# Patient Record
Sex: MALE | Race: BLACK OR AFRICAN AMERICAN | NOT HISPANIC OR LATINO | Employment: FULL TIME | URBAN - METROPOLITAN AREA
[De-identification: names, ages, dates, MRNs, and addresses within clinical notes are randomized per-mention and may not be internally consistent; named-entity substitution may affect disease eponyms.]

---

## 2021-02-23 ENCOUNTER — APPOINTMENT (EMERGENCY)
Dept: CT IMAGING | Facility: HOSPITAL | Age: 36
End: 2021-02-23
Payer: COMMERCIAL

## 2021-02-23 ENCOUNTER — HOSPITAL ENCOUNTER (EMERGENCY)
Facility: HOSPITAL | Age: 36
Discharge: HOME | End: 2021-02-24
Attending: EMERGENCY MEDICINE
Payer: COMMERCIAL

## 2021-02-23 DIAGNOSIS — M54.50 LOW BACK PAIN: Primary | ICD-10-CM

## 2021-02-23 LAB
ALBUMIN SERPL BCP-MCNC: 3.2 G/DL (ref 3.5–5)
ALP SERPL-CCNC: 57 U/L (ref 46–116)
ALT SERPL W P-5'-P-CCNC: 25 U/L (ref 12–78)
ANION GAP SERPL CALCULATED.3IONS-SCNC: 7 MMOL/L (ref 4–13)
AST SERPL W P-5'-P-CCNC: 13 U/L (ref 5–45)
BASOPHILS # BLD AUTO: 0.04 THOUSANDS/ΜL (ref 0–0.1)
BASOPHILS NFR BLD AUTO: 1 % (ref 0–1)
BILIRUB SERPL-MCNC: 0.32 MG/DL (ref 0.2–1)
BILIRUB UR QL STRIP: NEGATIVE
BUN SERPL-MCNC: 16 MG/DL (ref 5–25)
CALCIUM ALBUM COR SERPL-MCNC: 9.6 MG/DL (ref 8.3–10.1)
CALCIUM SERPL-MCNC: 9 MG/DL (ref 8.3–10.1)
CHLORIDE SERPL-SCNC: 106 MMOL/L (ref 100–108)
CLARITY UR: NORMAL
CO2 SERPL-SCNC: 29 MMOL/L (ref 21–32)
COLOR UR: YELLOW
CREAT SERPL-MCNC: 1.05 MG/DL (ref 0.6–1.3)
EOSINOPHIL # BLD AUTO: 0.09 THOUSAND/ΜL (ref 0–0.61)
EOSINOPHIL NFR BLD AUTO: 1 % (ref 0–6)
ERYTHROCYTE [DISTWIDTH] IN BLOOD BY AUTOMATED COUNT: 13 % (ref 11.6–15.1)
GFR SERPL CREATININE-BSD FRML MDRD: 107 ML/MIN/1.73SQ M
GLUCOSE SERPL-MCNC: 90 MG/DL (ref 65–140)
GLUCOSE UR STRIP-MCNC: NEGATIVE MG/DL
HCT VFR BLD AUTO: 39.8 % (ref 36.5–49.3)
HGB BLD-MCNC: 13.3 G/DL (ref 12–17)
HGB UR QL STRIP.AUTO: NEGATIVE
IMM GRANULOCYTES # BLD AUTO: 0.02 THOUSAND/UL (ref 0–0.2)
IMM GRANULOCYTES NFR BLD AUTO: 0 % (ref 0–2)
KETONES UR STRIP-MCNC: NEGATIVE MG/DL
LEUKOCYTE ESTERASE UR QL STRIP: NEGATIVE
LIPASE SERPL-CCNC: 124 U/L (ref 73–393)
LYMPHOCYTES # BLD AUTO: 2.14 THOUSANDS/ΜL (ref 0.6–4.47)
LYMPHOCYTES NFR BLD AUTO: 33 % (ref 14–44)
MCH RBC QN AUTO: 30.4 PG (ref 26.8–34.3)
MCHC RBC AUTO-ENTMCNC: 33.4 G/DL (ref 31.4–37.4)
MCV RBC AUTO: 91 FL (ref 82–98)
MONOCYTES # BLD AUTO: 0.61 THOUSAND/ΜL (ref 0.17–1.22)
MONOCYTES NFR BLD AUTO: 10 % (ref 4–12)
NEUTROPHILS # BLD AUTO: 3.53 THOUSANDS/ΜL (ref 1.85–7.62)
NEUTS SEG NFR BLD AUTO: 55 % (ref 43–75)
NITRITE UR QL STRIP: NEGATIVE
NRBC BLD AUTO-RTO: 0 /100 WBCS
PH UR STRIP.AUTO: 7.5 [PH] (ref 4.5–8)
PLATELET # BLD AUTO: 209 THOUSANDS/UL (ref 149–390)
PMV BLD AUTO: 11.4 FL (ref 8.9–12.7)
POTASSIUM SERPL-SCNC: 3.5 MMOL/L (ref 3.5–5.3)
PROT SERPL-MCNC: 6.8 G/DL (ref 6.4–8.2)
PROT UR STRIP-MCNC: NEGATIVE MG/DL
RBC # BLD AUTO: 4.38 MILLION/UL (ref 3.88–5.62)
SODIUM SERPL-SCNC: 142 MMOL/L (ref 136–145)
SP GR UR STRIP.AUTO: 1.02 (ref 1–1.03)
UROBILINOGEN UR QL STRIP.AUTO: 0.2 E.U./DL
WBC # BLD AUTO: 6.43 THOUSAND/UL (ref 4.31–10.16)

## 2021-02-23 PROCEDURE — 96374 THER/PROPH/DIAG INJ IV PUSH: CPT

## 2021-02-23 PROCEDURE — 74176 CT ABD & PELVIS W/O CONTRAST: CPT

## 2021-02-23 PROCEDURE — 80053 COMPREHEN METABOLIC PANEL: CPT | Performed by: EMERGENCY MEDICINE

## 2021-02-23 PROCEDURE — 99284 EMERGENCY DEPT VISIT MOD MDM: CPT | Performed by: EMERGENCY MEDICINE

## 2021-02-23 PROCEDURE — 99284 EMERGENCY DEPT VISIT MOD MDM: CPT

## 2021-02-23 PROCEDURE — G1004 CDSM NDSC: HCPCS

## 2021-02-23 PROCEDURE — 96375 TX/PRO/DX INJ NEW DRUG ADDON: CPT

## 2021-02-23 PROCEDURE — 36415 COLL VENOUS BLD VENIPUNCTURE: CPT | Performed by: EMERGENCY MEDICINE

## 2021-02-23 PROCEDURE — 83690 ASSAY OF LIPASE: CPT | Performed by: EMERGENCY MEDICINE

## 2021-02-23 PROCEDURE — 96361 HYDRATE IV INFUSION ADD-ON: CPT

## 2021-02-23 PROCEDURE — 81003 URINALYSIS AUTO W/O SCOPE: CPT

## 2021-02-23 PROCEDURE — 85025 COMPLETE CBC W/AUTO DIFF WBC: CPT | Performed by: EMERGENCY MEDICINE

## 2021-02-23 RX ORDER — ARIPIPRAZOLE 10 MG/1
TABLET ORAL
COMMUNITY
End: 2021-04-07 | Stop reason: HOSPADM

## 2021-02-23 RX ORDER — DIVALPROEX SODIUM 250 MG/1
250 TABLET, DELAYED RELEASE ORAL EVERY 8 HOURS SCHEDULED
COMMUNITY
End: 2021-04-07 | Stop reason: HOSPADM

## 2021-02-23 RX ORDER — MORPHINE SULFATE 4 MG/ML
4 INJECTION, SOLUTION INTRAMUSCULAR; INTRAVENOUS ONCE
Status: COMPLETED | OUTPATIENT
Start: 2021-02-23 | End: 2021-02-23

## 2021-02-23 RX ORDER — CYCLOBENZAPRINE HCL 10 MG
10 TABLET ORAL 2 TIMES DAILY PRN
Qty: 20 TABLET | Refills: 0 | Status: SHIPPED | OUTPATIENT
Start: 2021-02-23 | End: 2021-04-07 | Stop reason: HOSPADM

## 2021-02-23 RX ORDER — ACETAMINOPHEN 500 MG
500 TABLET ORAL
COMMUNITY
End: 2021-04-07 | Stop reason: HOSPADM

## 2021-02-23 RX ORDER — LIDOCAINE 50 MG/G
1 PATCH TOPICAL ONCE
Status: DISCONTINUED | OUTPATIENT
Start: 2021-02-23 | End: 2021-02-24 | Stop reason: HOSPADM

## 2021-02-23 RX ORDER — TOPIRAMATE 25 MG/1
25 TABLET ORAL 2 TIMES DAILY
COMMUNITY
End: 2021-04-07 | Stop reason: HOSPADM

## 2021-02-23 RX ORDER — ONDANSETRON 2 MG/ML
4 INJECTION INTRAMUSCULAR; INTRAVENOUS ONCE
Status: COMPLETED | OUTPATIENT
Start: 2021-02-23 | End: 2021-02-23

## 2021-02-23 RX ORDER — ACETAMINOPHEN 500 MG
1000 TABLET ORAL EVERY 8 HOURS PRN
Qty: 20 TABLET | Refills: 0 | Status: SHIPPED | OUTPATIENT
Start: 2021-02-23 | End: 2021-04-07 | Stop reason: HOSPADM

## 2021-02-23 RX ADMIN — MORPHINE SULFATE 4 MG: 4 INJECTION INTRAVENOUS at 22:04

## 2021-02-23 RX ADMIN — SODIUM CHLORIDE 1000 ML: 0.9 INJECTION, SOLUTION INTRAVENOUS at 21:59

## 2021-02-23 RX ADMIN — ONDANSETRON 4 MG: 2 INJECTION INTRAMUSCULAR; INTRAVENOUS at 22:02

## 2021-02-23 NOTE — Clinical Note
Eldon Samayoa was seen and treated in our emergency department on 2/23/2021  Diagnosis:     Theo Ochoa  may return to school on return date  He may return on this date: 02/26/2021    Then no heavy lifting over 10 lbs  For one week     If you have any questions or concerns, please don't hesitate to call        Severiano Puentes MD    ______________________________           _______________          _______________  Hospital Representative                              Date                                Time

## 2021-02-24 VITALS
OXYGEN SATURATION: 97 % | SYSTOLIC BLOOD PRESSURE: 106 MMHG | HEART RATE: 72 BPM | RESPIRATION RATE: 17 BRPM | DIASTOLIC BLOOD PRESSURE: 61 MMHG | TEMPERATURE: 97.5 F

## 2021-02-24 RX ADMIN — LIDOCAINE 1 PATCH: 50 PATCH CUTANEOUS at 00:07

## 2021-02-24 NOTE — ED PROVIDER NOTES
History  Chief Complaint   Patient presents with    Flank Pain     3-4 days worsening left flank and LLQ abdominal pain with difficulty urinating and dark colored urine as described by patient  Referred from [facility]      Abdominal Pain     C/o L flank pain radiating to the L abd  For the past 3 days, constant  Pt  Never had a kidney stone before  No heavy lifting, no injury  No fevers, no n/v/d, no urinary symptoms  Prior to Admission Medications   Prescriptions Last Dose Informant Patient Reported? Taking? ARIPiprazole (Abilify) 10 mg tablet   Yes Yes   Sig: Take by mouth   acetaminophen (TYLENOL) 500 mg tablet   Yes Yes   Sig: Take 500 mg by mouth   divalproex sodium (DEPAKOTE) 250 mg EC tablet   Yes Yes   Sig: Take 250 mg by mouth every 8 (eight) hours   topiramate (TOPAMAX) 25 mg tablet   Yes Yes   Sig: Take 25 mg by mouth 2 (two) times a day      Facility-Administered Medications: None       History reviewed  No pertinent past medical history  History reviewed  No pertinent surgical history  History reviewed  No pertinent family history  I have reviewed and agree with the history as documented  E-Cigarette/Vaping     E-Cigarette/Vaping Substances     Social History     Tobacco Use    Smoking status: Current Every Day Smoker    Smokeless tobacco: Never Used   Substance Use Topics    Alcohol use: Not Currently    Drug use: Not Currently       Review of Systems   Constitutional: Negative for appetite change, fatigue and fever  HENT: Negative for rhinorrhea and sore throat  Eyes: Negative for pain  Respiratory: Negative for cough, shortness of breath and wheezing  Cardiovascular: Negative for chest pain and leg swelling  Gastrointestinal: Positive for abdominal pain  Negative for diarrhea, nausea and vomiting  Genitourinary: Positive for flank pain  Negative for dysuria  Musculoskeletal: Negative for back pain and neck pain  Skin: Negative for rash  Neurological: Negative for syncope and headaches  Psychiatric/Behavioral:        Mood normal       Physical Exam  Physical Exam  Vitals signs and nursing note reviewed  Constitutional:       Appearance: He is well-developed  HENT:      Head: Normocephalic and atraumatic  Neck:      Musculoskeletal: Normal range of motion and neck supple  Cardiovascular:      Rate and Rhythm: Normal rate and regular rhythm  Pulmonary:      Effort: Pulmonary effort is normal  No respiratory distress  Breath sounds: Normal breath sounds  Abdominal:      Palpations: Abdomen is soft  Comments: L mid abd  Tenderness, no r/g   Musculoskeletal: Normal range of motion  Comments: L CVA tenderness   Skin:     General: Skin is warm and dry  Neurological:      Mental Status: He is alert and oriented to person, place, and time           Vital Signs  ED Triage Vitals [02/23/21 1951]   Temperature Pulse Respirations Blood Pressure SpO2   97 5 °F (36 4 °C) 76 20 114/56 98 %      Temp Source Heart Rate Source Patient Position - Orthostatic VS BP Location FiO2 (%)   Oral Monitor Sitting Left arm --      Pain Score       Worst Possible Pain           Vitals:    02/23/21 1951 02/23/21 2205 02/24/21 0009   BP: 114/56 96/51 106/61   Pulse: 76 67 72   Patient Position - Orthostatic VS: Sitting Lying Lying         Visual Acuity      ED Medications  Medications   lidocaine (LIDODERM) 5 % patch 1 patch (1 patch Topical Medication Applied 2/24/21 0007)   sodium chloride 0 9 % bolus 1,000 mL (0 mL Intravenous Stopped 2/24/21 0005)   morphine (PF) 4 mg/mL injection 4 mg (4 mg Intravenous Given 2/23/21 2204)   ondansetron (ZOFRAN) injection 4 mg (4 mg Intravenous Given 2/23/21 2202)       Diagnostic Studies  Results Reviewed     Procedure Component Value Units Date/Time    Lipase [[id number]]  (Normal) Collected: 02/23/21 2159    Lab Status: Final result Specimen: Blood from Arm, Right Updated: 02/23/21 2231     Lipase 124 u/L Comprehensive metabolic panel [[id number]]  (Abnormal) Collected: 02/23/21 2159    Lab Status: Final result Specimen: Blood from Arm, Right Updated: 02/23/21 2231     Sodium 142 mmol/L      Potassium 3 5 mmol/L      Chloride 106 mmol/L      CO2 29 mmol/L      ANION GAP 7 mmol/L      BUN 16 mg/dL      Creatinine 1 05 mg/dL      Glucose 90 mg/dL      Calcium 9 0 mg/dL      Corrected Calcium 9 6 mg/dL      AST 13 U/L      ALT 25 U/L      Alkaline Phosphatase 57 U/L      Total Protein 6 8 g/dL      Albumin 3 2 g/dL      Total Bilirubin 0 32 mg/dL      eGFR 107 ml/min/1 73sq m     Narrative:      National Kidney Disease Foundation guidelines for Chronic Kidney Disease (CKD):     Stage 1 with normal or high GFR (GFR > 90 mL/min/1 73 square meters)    Stage 2 Mild CKD (GFR = 60-89 mL/min/1 73 square meters)    Stage 3A Moderate CKD (GFR = 45-59 mL/min/1 73 square meters)    Stage 3B Moderate CKD (GFR = 30-44 mL/min/1 73 square meters)    Stage 4 Severe CKD (GFR = 15-29 mL/min/1 73 square meters)    Stage 5 End Stage CKD (GFR <15 mL/min/1 73 square meters)  Note: GFR calculation is accurate only with a steady state creatinine    CBC and differential [[id number]] Collected: 02/23/21 2159    Lab Status: Final result Specimen: Blood from Arm, Right Updated: 02/23/21 2212     WBC 6 43 Thousand/uL      RBC 4 38 Million/uL      Hemoglobin 13 3 g/dL      Hematocrit 39 8 %      MCV 91 fL      MCH 30 4 pg      MCHC 33 4 g/dL      RDW 13 0 %      MPV 11 4 fL      Platelets 995 Thousands/uL      nRBC 0 /100 WBCs      Neutrophils Relative 55 %      Immat GRANS % 0 %      Lymphocytes Relative 33 %      Monocytes Relative 10 %      Eosinophils Relative 1 %      Basophils Relative 1 %      Neutrophils Absolute 3 53 Thousands/µL      Immature Grans Absolute 0 02 Thousand/uL      Lymphocytes Absolute 2 14 Thousands/µL      Monocytes Absolute 0 61 Thousand/µL      Eosinophils Absolute 0 09 Thousand/µL      Basophils Absolute 0 04 Thousands/µL     Urine Macroscopic, POC [[id number]] Collected: 02/23/21 2146    Lab Status: Final result Specimen: Urine Updated: 02/23/21 2148     Color, UA Yellow     Clarity, UA Cloudy     pH, UA 7 5     Leukocytes, UA Negative     Nitrite, UA Negative     Protein, UA Negative mg/dl      Glucose, UA Negative mg/dl      Ketones, UA Negative mg/dl      Urobilinogen, UA 0 2 E U /dl      Bilirubin, UA Negative     Blood, UA Negative     Specific Gravity, UA 1 020    Narrative:      [facility] RESULT                 CT renal stone study abdomen pelvis without contrast   Final Result by Celestine Vyas DO (02/23 2210)      No evidence of hydronephrosis or urinary tract calculi  Workstation performed: [id number]                    Procedures  Procedures         ED Course                             SBIRT 22yo+      Most Recent Value   SBIRT (23 yo +)   In order to provide better care to our patients, we are screening all of our patients for alcohol and drug use  Would it be okay to ask you these screening questions? No Filed at: 02/24/2021 0008                    MDM  Number of Diagnoses or Management Options  Low back pain:      Amount and/or Complexity of Data Reviewed  Clinical lab tests: ordered and reviewed  Tests in the radiology section of CPT®: ordered and reviewed    Risk of Complications, Morbidity, and/or Mortality  Presenting problems: moderate  General comments: Pt  Felt better in ER - he is stable for outpt  Follow up and wanted to go home    (I went over labs and CT with him)        Disposition  Final diagnoses:   Low back pain     Time reflects when diagnosis was documented in both MDM as applicable and the Disposition within this note     Time User Action Codes Description Comment    2/23/2021 11:21 PM Brianna Encarnacion Add [M54 5] Low back pain       ED Disposition     ED Disposition Condition Date/Time Comment    Discharge Stable Tue Feb 23, 2021 11:21 PM Leona Ray discharge to home/self care  Follow-up Information     Follow up With Specialties Details Why Contact Info Additional [address]   [address], [address] [facility] [address]  [address], [address], [address] [place], [place], [address]          Discharge Medication List as of 2/23/2021 11:23 PM      START taking these medications    Details   !! acetaminophen (TYLENOL) 500 mg tablet Take 2 tablets (1,000 mg total) by mouth every 8 (eight) hours as needed for moderate pain, Starting Tue 2/23/2021, Normal      cyclobenzaprine (FLEXERIL) 10 mg tablet Take 1 tablet (10 mg total) by mouth 2 (two) times a day as needed for muscle spasms, Starting Tue 2/23/2021, Normal       !! - Potential duplicate medications found  Please discuss with provider  CONTINUE these medications which have NOT CHANGED    Details   !! acetaminophen (TYLENOL) 500 mg tablet Take 500 mg by mouth, Historical Med      ARIPiprazole (Abilify) 10 mg tablet Take by mouth, Historical Med      divalproex sodium (DEPAKOTE) 250 mg EC tablet Take 250 mg by mouth every 8 (eight) hours, Historical Med      topiramate (TOPAMAX) 25 mg tablet Take 25 mg by mouth 2 (two) times a day, Historical Med       !! - Potential duplicate medications found  Please discuss with provider  No discharge procedures on file      PDMP Review     None          ED Provider  Electronically Signed by           Leonila Phan MD  02/24/21 9917

## 2021-03-26 ENCOUNTER — HOSPITAL ENCOUNTER (EMERGENCY)
Facility: HOSPITAL | Age: 36
End: 2021-03-27
Attending: EMERGENCY MEDICINE
Payer: COMMERCIAL

## 2021-03-26 DIAGNOSIS — R45.851 SUICIDAL IDEATIONS: Primary | ICD-10-CM

## 2021-03-26 DIAGNOSIS — R44.1 VISUAL HALLUCINATIONS: ICD-10-CM

## 2021-03-26 DIAGNOSIS — F29 PSYCHOSES (HCC): ICD-10-CM

## 2021-03-26 LAB
AMPHETAMINES SERPL QL SCN: NEGATIVE
BARBITURATES UR QL: NEGATIVE
BENZODIAZ UR QL: NEGATIVE
COCAINE UR QL: POSITIVE
ETHANOL EXG-MCNC: 0 MG/DL
FLUAV RNA RESP QL NAA+PROBE: NEGATIVE
FLUBV RNA RESP QL NAA+PROBE: NEGATIVE
METHADONE UR QL: NEGATIVE
OPIATES UR QL SCN: NEGATIVE
OXYCODONE+OXYMORPHONE UR QL SCN: NEGATIVE
PCP UR QL: NEGATIVE
RSV RNA RESP QL NAA+PROBE: NEGATIVE
SARS-COV-2 RNA RESP QL NAA+PROBE: NEGATIVE
THC UR QL: NEGATIVE

## 2021-03-26 PROCEDURE — 99285 EMERGENCY DEPT VISIT HI MDM: CPT

## 2021-03-26 PROCEDURE — 80307 DRUG TEST PRSMV CHEM ANLYZR: CPT | Performed by: EMERGENCY MEDICINE

## 2021-03-26 PROCEDURE — 96372 THER/PROPH/DIAG INJ SC/IM: CPT

## 2021-03-26 PROCEDURE — 82075 ASSAY OF BREATH ETHANOL: CPT | Performed by: EMERGENCY MEDICINE

## 2021-03-26 PROCEDURE — 99285 EMERGENCY DEPT VISIT HI MDM: CPT | Performed by: EMERGENCY MEDICINE

## 2021-03-26 PROCEDURE — 0241U HB NFCT DS VIR RESP RNA 4 TRGT: CPT | Performed by: EMERGENCY MEDICINE

## 2021-03-26 RX ORDER — DIPHENHYDRAMINE HYDROCHLORIDE 50 MG/ML
50 INJECTION INTRAMUSCULAR; INTRAVENOUS EVERY 6 HOURS PRN
Status: CANCELLED | OUTPATIENT
Start: 2021-03-26

## 2021-03-26 RX ORDER — ACETAMINOPHEN 325 MG/1
650 TABLET ORAL ONCE
Status: COMPLETED | OUTPATIENT
Start: 2021-03-26 | End: 2021-03-26

## 2021-03-26 RX ORDER — LORAZEPAM 2 MG/ML
1 INJECTION INTRAMUSCULAR
Status: CANCELLED | OUTPATIENT
Start: 2021-03-26

## 2021-03-26 RX ORDER — BENZTROPINE MESYLATE 1 MG/ML
0.5 INJECTION INTRAMUSCULAR; INTRAVENOUS
Status: CANCELLED | OUTPATIENT
Start: 2021-03-26

## 2021-03-26 RX ORDER — LORAZEPAM 2 MG/ML
2 INJECTION INTRAMUSCULAR EVERY 6 HOURS PRN
Status: CANCELLED | OUTPATIENT
Start: 2021-03-26

## 2021-03-26 RX ORDER — TRAZODONE HYDROCHLORIDE 100 MG/1
50 TABLET ORAL
Status: CANCELLED | OUTPATIENT
Start: 2021-03-26

## 2021-03-26 RX ORDER — RISPERIDONE 0.25 MG/1
0.25 TABLET, ORALLY DISINTEGRATING ORAL
Status: CANCELLED | OUTPATIENT
Start: 2021-03-26

## 2021-03-26 RX ORDER — LORAZEPAM 2 MG/ML
2 INJECTION INTRAMUSCULAR
Status: CANCELLED | OUTPATIENT
Start: 2021-03-26

## 2021-03-26 RX ORDER — HYDROXYZINE HYDROCHLORIDE 25 MG/1
50 TABLET, FILM COATED ORAL
Status: CANCELLED | OUTPATIENT
Start: 2021-03-26

## 2021-03-26 RX ORDER — HALOPERIDOL 5 MG/ML
2.5 INJECTION INTRAMUSCULAR
Status: CANCELLED | OUTPATIENT
Start: 2021-03-26

## 2021-03-26 RX ORDER — ACETAMINOPHEN 325 MG/1
650 TABLET ORAL ONCE
Status: CANCELLED | OUTPATIENT
Start: 2021-03-26 | End: 2021-03-26

## 2021-03-26 RX ORDER — BENZTROPINE MESYLATE 1 MG/ML
1 INJECTION INTRAMUSCULAR; INTRAVENOUS
Status: CANCELLED | OUTPATIENT
Start: 2021-03-26

## 2021-03-26 RX ORDER — HYDROXYZINE HYDROCHLORIDE 25 MG/1
25 TABLET, FILM COATED ORAL
Status: CANCELLED | OUTPATIENT
Start: 2021-03-26

## 2021-03-26 RX ORDER — HALOPERIDOL 5 MG/ML
5 INJECTION INTRAMUSCULAR
Status: CANCELLED | OUTPATIENT
Start: 2021-03-26

## 2021-03-26 RX ORDER — RISPERIDONE 1 MG/1
2 TABLET, ORALLY DISINTEGRATING ORAL
Status: CANCELLED | OUTPATIENT
Start: 2021-03-26

## 2021-03-26 RX ORDER — LORAZEPAM 2 MG/ML
2 INJECTION INTRAMUSCULAR ONCE
Status: COMPLETED | OUTPATIENT
Start: 2021-03-26 | End: 2021-03-26

## 2021-03-26 RX ORDER — RISPERIDONE 1 MG/1
1 TABLET, ORALLY DISINTEGRATING ORAL
Status: CANCELLED | OUTPATIENT
Start: 2021-03-26

## 2021-03-26 RX ORDER — RISPERIDONE 0.5 MG/1
0.5 TABLET, ORALLY DISINTEGRATING ORAL
Status: CANCELLED | OUTPATIENT
Start: 2021-03-26

## 2021-03-26 RX ORDER — NICOTINE 21 MG/24HR
1 PATCH, TRANSDERMAL 24 HOURS TRANSDERMAL DAILY
Status: CANCELLED | OUTPATIENT
Start: 2021-03-27

## 2021-03-26 RX ORDER — ACETAMINOPHEN 325 MG/1
650 TABLET ORAL EVERY 4 HOURS PRN
Status: CANCELLED | OUTPATIENT
Start: 2021-03-26

## 2021-03-26 RX ORDER — HALOPERIDOL 5 MG/ML
5 INJECTION INTRAMUSCULAR ONCE
Status: COMPLETED | OUTPATIENT
Start: 2021-03-26 | End: 2021-03-26

## 2021-03-26 RX ORDER — BENZTROPINE MESYLATE 0.5 MG/1
1 TABLET ORAL
Status: CANCELLED | OUTPATIENT
Start: 2021-03-26

## 2021-03-26 RX ORDER — ACETAMINOPHEN 325 MG/1
975 TABLET ORAL EVERY 6 HOURS PRN
Status: CANCELLED | OUTPATIENT
Start: 2021-03-26

## 2021-03-26 RX ORDER — ACETAMINOPHEN 325 MG/1
650 TABLET ORAL EVERY 6 HOURS PRN
Status: CANCELLED | OUTPATIENT
Start: 2021-03-26

## 2021-03-26 RX ORDER — HYDROXYZINE HYDROCHLORIDE 25 MG/1
100 TABLET, FILM COATED ORAL
Status: CANCELLED | OUTPATIENT
Start: 2021-03-26

## 2021-03-26 RX ORDER — OLANZAPINE 5 MG/1
10 TABLET, ORALLY DISINTEGRATING ORAL ONCE
Status: COMPLETED | OUTPATIENT
Start: 2021-03-26 | End: 2021-03-26

## 2021-03-26 RX ADMIN — LORAZEPAM 2 MG: 2 INJECTION INTRAMUSCULAR; INTRAVENOUS at 19:47

## 2021-03-26 RX ADMIN — OLANZAPINE 10 MG: 5 TABLET, ORALLY DISINTEGRATING ORAL at 17:29

## 2021-03-26 RX ADMIN — ACETAMINOPHEN 650 MG: 325 TABLET, FILM COATED ORAL at 17:32

## 2021-03-26 RX ADMIN — HALOPERIDOL LACTATE 5 MG: 5 INJECTION, SOLUTION INTRAMUSCULAR at 08:39

## 2021-03-26 NOTE — ED NOTES
Patient was self-referred  He stated he was being followed and that people are going to kill him  He has a history of schizoaffective disorder, psychosis and chronic substance abuse  He is a limited historian who does admit to using cocaine and K2  Patient stated he was suicidal and was on a bridge planning on jumping; however, he related he did not jump because he knew there were people under the bridge waiting to kill him  He stated he walked in traffic recently as well  He presented as hypervigilent and stated he was hearing the voices of people telling him they are going to kill him and that he should kill himself  He stated he occasionally catches glimpses of those following him  He reports poor sleep and racing thoughts  Patient reports his mood to be anxious and depressed  He was recently admitted to [facility] and stated he signed himself out  He has a history of multiple psychiatric admissions with no outpatient follow up  He is not currently taking medication  There is a history of presentation for an intentional overdose in 9/2019, in which he reported taking about 7 unknown pills  There is record of an overdose of doxepin  lexapro and possibly haldol in 4/2015  Patient has used crack cocaine since age 8 and K2 for the past 3-4 years  He states, "I need mental health treatment  I don;t want drug treatment " He has a history of signing himself out of emergency departments, inpatient psychiatry and inpatient dual treatments

## 2021-03-26 NOTE — ED NOTES
[place] indicates: [place]  Recipient #[id number]  Behavioral health managed by [facility]  Phone number: [phone]  Primary payor is [employer] [facility] [place]  Behavioral health managed by [employer]  Phone number: [phone]

## 2021-03-26 NOTE — ED PROVIDER NOTES
History  Chief Complaint   Patient presents with    Psychiatric Evaluation     pt presents extremely paranoid fearful, states "the people with red faces and horns are after me" (+)AH/VH and (+) SI     31year old male experiencing hallucinations and auditory commands  Patient states he sees individuals with red face is in horns around him  He hears voices telling him that he is worthless and he should jump off a bridge  He states he was even at 1 point trying to find rope to hang himself  Patient is extremely paranoid and difficult to get a complete history out of  He does state that he was recently hospitalized this facility a meds but has decided not to  In addition he admits to using recreational drugs including K2  Denies alcohol abuse  History provided by:  Patient   used: No        Prior to Admission Medications   Prescriptions Last Dose Informant Patient Reported? Taking?    ARIPiprazole (Abilify) 10 mg tablet Not Taking at Unknown time  Yes No   Sig: Take by mouth   acetaminophen (TYLENOL) 500 mg tablet Not Taking at Unknown time  Yes No   Sig: Take 500 mg by mouth   acetaminophen (TYLENOL) 500 mg tablet Not Taking at Unknown time  No No   Sig: Take 2 tablets (1,000 mg total) by mouth every 8 (eight) hours as needed for moderate pain   Patient not taking: Reported on 3/26/2021   cyclobenzaprine (FLEXERIL) 10 mg tablet Not Taking at Unknown time  No No   Sig: Take 1 tablet (10 mg total) by mouth 2 (two) times a day as needed for muscle spasms   Patient not taking: Reported on 3/26/2021   divalproex sodium (DEPAKOTE) 250 mg EC tablet Not Taking at Unknown time  Yes No   Sig: Take 250 mg by mouth every 8 (eight) hours   topiramate (TOPAMAX) 25 mg tablet Not Taking at Unknown time  Yes No   Sig: Take 25 mg by mouth 2 (two) times a day      Facility-Administered Medications: None       Past Medical History:   Diagnosis Date    Addiction [facility] drug ([facility] [address] )     Alcohol abuse     Bipolar disorder ([facility] )     Chronic pain disorder     Depression     Hallucination     Psychiatric illness     Schizoaffective disorder ([facility] )     Substance abuse ([place] [address] )     Suicide attempt [facility])        Past Surgical History:   Procedure Laterality Date    KNEE SURGERY         History reviewed  No pertinent family history  I have reviewed and agree with the history as documented  E-Cigarette/Vaping     E-Cigarette/Vaping Substances     Social History     Tobacco Use    Smoking status: Current Every Day Smoker     Packs/day: 1 00    Smokeless tobacco: Never Used   Substance Use Topics    Alcohol use: Yes     Frequency: 2-4 times a month    Drug use: Yes     Types: "Crack" cocaine     Comment: K2       Review of Systems   Constitutional: Negative  Negative for chills and fever  HENT: Negative  Negative for rhinorrhea, sore throat, trouble swallowing and voice change  Eyes: Negative  Negative for pain and visual disturbance  Respiratory: Negative  Negative for cough, shortness of breath and wheezing  Cardiovascular: Negative  Negative for chest pain and palpitations  Gastrointestinal: Negative for abdominal pain, diarrhea, nausea and vomiting  Genitourinary: Negative  Negative for dysuria and frequency  Musculoskeletal: Negative  Negative for neck pain and neck stiffness  Skin: Negative  Negative for rash  Neurological: Negative  Negative for dizziness, speech difficulty, weakness, light-headedness and numbness  Psychiatric/Behavioral: Positive for agitation, decreased concentration, hallucinations, sleep disturbance and suicidal ideas  Negative for self-injury  The patient is nervous/anxious  Physical Exam  Physical Exam  Vitals signs and nursing note reviewed  Constitutional:       General: He is not in acute distress  Appearance: He is well-developed  HENT:      Head: Normocephalic and atraumatic     Eyes:      Conjunctiva/sclera: Conjunctivae normal  Pupils: Pupils are equal, round, and reactive to light  Neck:      Musculoskeletal: Normal range of motion and neck supple  Trachea: No tracheal deviation  Cardiovascular:      Rate and Rhythm: Normal rate and regular rhythm  Pulmonary:      Effort: Pulmonary effort is normal  No respiratory distress  Breath sounds: Normal breath sounds  No wheezing or rales  Abdominal:      General: Bowel sounds are normal  There is no distension  Palpations: Abdomen is soft  Tenderness: There is no abdominal tenderness  There is no guarding or rebound  Musculoskeletal: Normal range of motion  General: No tenderness or deformity  Skin:     General: Skin is warm and dry  Capillary Refill: Capillary refill takes less than 2 seconds  Findings: No rash  Neurological:      Mental Status: He is alert and oriented to person, place, and time  Psychiatric:         Attention and Perception: He is attentive  He perceives auditory and visual hallucinations  Mood and Affect: Mood is anxious  Affect is labile  Speech: Speech is rapid and pressured and tangential          Behavior: Behavior is hyperactive  Thought Content: Thought content is paranoid  Thought content includes suicidal ideation  Thought content includes suicidal plan           Vital Signs  ED Triage Vitals [03/26/21 0727]   Temperature Pulse Respirations Blood Pressure SpO2   97 6 °F (36 4 °C) 76 16 100/57 97 %      Temp Source Heart Rate Source Patient Position - Orthostatic VS BP Location FiO2 (%)   Tympanic Monitor Sitting Right arm --      Pain Score       --           Vitals:    03/26/21 0727 03/26/21 1137   BP: 100/57 122/67   Pulse: 76 91   Patient Position - Orthostatic VS: Sitting Sitting         Visual Acuity      ED Medications  Medications   haloperidol lactate (HALDOL) injection 5 mg (5 mg Intramuscular Given 3/26/21 4653)       Diagnostic Studies  Results Reviewed     Procedure Component Value Units Date/Time    COVID19, Influenza A/B, RSV PCR, [facility] [[id number]]  (Normal) Collected: 03/26/21 0756    Lab Status: Final result Specimen: Nares from Nose Updated: 03/26/21 0909     SARS-CoV-2 Negative     INFLUENZA A PCR Negative     INFLUENZA B PCR Negative     RSV PCR Negative    Narrative: This test has been authorized by FDA under an EUA (Emergency Use Assay) for use by authorized laboratories  Clinical caution and judgement should be used with the interpretation of these results with consideration of the clinical impression and other laboratory testing  Testing reported as "Positive" or "Negative" has been proven to be accurate according to standard laboratory validation requirements  All testing is performed with control materials showing appropriate reactivity at standard intervals  POCT alcohol breath test [[id number]]  (Normal) Resulted: 03/26/21 0855    Lab Status: Final result Updated: 03/26/21 0856     EXTBreath Alcohol 0 00    Rapid drug screen, urine [[id number]]     Lab Status: No result Specimen: Urine                  No orders to display              Procedures  Procedures         ED Course  ED Course as of Mar 26 2147   Fri Mar 26, 2021   1028 Patient medically cleared for crisis evaluation  1538 201 completed  Patient resting comfortably  Care transferred to [facility] provider  SBIRT 22yo+      Most Recent Value   SBIRT (24 yo +)   In order to provide better care to our patients, we are screening all of our patients for alcohol and drug use  Would it be okay to ask you these screening questions? Yes Filed at: 03/26/2021 3952   Initial Alcohol Screen: US AUDIT-C    1  How often do you have a drink containing alcohol?  0 Filed at: 03/26/2021 0842   2  How many drinks containing alcohol do you have on a typical day you are drinking? 0 Filed at: 03/26/2021 0842   3a  Male UNDER 65: How often do you have five or more drinks on one occasion? 0 Filed at: 03/26/2021 0842   Audit-C Score  0 Filed at: 03/26/2021 2253   HANNY: How many times in the past year have you    Used an illegal drug or used a prescription medication for non-medical reasons? Weekly Filed at: 03/26/2021 0842   DAST-10: In the past 12 months      1  Have you used drugs other than those required for medical reasons? 1 Filed at: 03/26/2021 0842   2  Do you use more than one drug at a time? 1 Filed at: 03/26/2021 6576                    MDM  Number of Diagnoses or Management Options  Psychoses ([facility] [address] ):   Suicidal ideations:   Visual hallucinations:   Diagnosis management comments: Patient has been medically cleared for crisis evaluation  Will continue to monitor for safety  Disposition pending crisis evaluation  Disposition  Final diagnoses:   Suicidal ideations   Psychoses ([facility] [address] )   Visual hallucinations     Time reflects when diagnosis was documented in both MDM as applicable and the Disposition within this note     Time User Action Codes Description Comment    3/26/2021  8:10 AM Rohit Marrero [R45 851] Suicidal ideations     3/26/2021  8:10 AM Rohit Brothers Add [F29] Psychoses ([facility] [address] )     3/26/2021  8:10 AM Rohit Brothers Add [R44 1] Visual hallucinations       ED Disposition     ED Disposition Condition Date/Time Comment    Transfer to [facility] Mar 26, 2021  8:10 AM Latonya Friend should be transferred out to  and has been medically cleared  Follow-up Information    None         Patient's Medications   Discharge Prescriptions    No medications on file     No discharge procedures on file      PDMP Review     None          ED Provider  Electronically Signed by           Rexie Olszewski,   03/26/21 226 No Annika Kim,   03/26/21 2147

## 2021-03-26 NOTE — ED NOTES
Pt with multiple complaints at this time,  " they only gave me a sandwich and fruit and drinks for dinner?"  " what's going on, where am I going?"   Pt offered other foods from kitchen area throughout afternoon, Crisis personnel aware to speak with pt as to progress, pt states " where is the doctor to look at me?"  Dr Marychuy Montgomery aware of request  1:1 continues at this time, pt tolerating foods and fluids offered without distress      Deidra Cooley RN  03/26/21 4543

## 2021-03-26 NOTE — ED NOTES
PT allowed to keep cookies from belongings per charge nurse  PT requested juice and water  PT was provided safety cup of juice and water        Ely Latif  03/26/21 6117

## 2021-03-26 NOTE — LETTER
[facility] EMERGENCY DEPARTMENT  [address] [facility] [address]  [phone]  Dept: [phone]      EMTALA TRANSFER CONSENT    NAME Mehrdad Ashraf                                         1985                              MRN [id number]    I have been informed of my rights regarding examination, treatment, and transfer   by Dr Marcela Waterman MD    Benefits: Continuity of care    Risks: Potential for delay in receiving treatment      { ED EMTALA TRANSFER CHOICES:[id number]}    I authorize the performance of emergency medical procedures and treatments upon me in both transit and upon arrival at the receiving facility  Additionally, I authorize the release of any and all medical records to the receiving facility and request they be transported with me, if possible  I understand that the safest mode of transportation during a medical emergency is an ambulance and that the Hospital advocates the use of this mode of transport  Risks of traveling to the receiving facility by car, including absence of medical control, life sustaining equipment, such as oxygen, and medical personnel has been explained to me and I fully understand them  (PAYAM CORRECT BOX BELOW)  [  ]  I consent to the stated transfer and to be transported by ambulance/helicopter  [  ]  I consent to the stated transfer, but refuse transportation by ambulance and accept full responsibility for my transportation by car    I understand the risks of non-ambulance transfers and I exonerate the Hospital and its staff from any deterioration in my condition that results from this refusal     X___________________________________________    DATE  21  TIME________  Signature of patient or legally responsible individual signing on patient behalf           RELATIONSHIP TO PATIENT_________________________          Provider Certification    NAME Mehrdad Ashraf                                        MARIS[place] 1985 MRN [id number]    A medical screening exam was performed on the above named patient  Based on the examination:    Condition Necessitating Transfer The primary encounter diagnosis was Suicidal ideations  Diagnoses of Psychoses ([address] ) and Visual hallucinations were also pertinent to this visit  Patient Condition: Other (Include comment)_________________________________________    Reason for Transfer: No bed available at level of patient's needs    Transfer Requirements: [address] [place] [address] 2   · Space available and qualified personnel available for treatment as acknowledged by Mike Foster ; [phone]  · Agreed to accept transfer and to provide appropriate medical treatment as acknowledged by       Dr Larry Collins  · Appropriate medical records of the examination and treatment of the patient are provided at the time of transfer   [address] [facility], [address] _______  · Transfer will be performed by qualified personnel from [address] [place] [address]  and appropriate transfer equipment as required, including the use of necessary and appropriate life support measures      Provider Certification: I have examined the patient and explained the following risks and benefits of being transferred/refusing transfer to the patient/family:  General risk, such as traffic hazards, adverse weather conditions, rough terrain or turbulence, possible failure of equipment (including vehicle or aircraft), or consequences of actions of persons outside the control of the transport personnel      Based on these reasonable risks and benefits to the patient and/or the unborn child(peggy), and based upon the information available at the time of the patients examination, I certify that the medical benefits reasonably to be expected from the provision of appropriate medical treatments at another medical facility outweigh the increasing risks, if any, to the individuals medical condition, and in the case of labor to the unborn child, from effecting the transfer      X____________________________________________ DATE 03/26/21        TIME_______      ORIGINAL - SEND TO MEDICAL RECORDS   COPY - SEND WITH PATIENT DURING TRANSFER
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
## 2021-03-27 ENCOUNTER — APPOINTMENT (INPATIENT)
Dept: RADIOLOGY | Facility: HOSPITAL | Age: 36
DRG: 885 | End: 2021-03-27
Payer: COMMERCIAL

## 2021-03-27 ENCOUNTER — HOSPITAL ENCOUNTER (INPATIENT)
Facility: HOSPITAL | Age: 36
LOS: 1 days | DRG: 885 | End: 2021-03-28
Attending: STUDENT IN AN ORGANIZED HEALTH CARE EDUCATION/TRAINING PROGRAM | Admitting: PSYCHIATRY & NEUROLOGY
Payer: COMMERCIAL

## 2021-03-27 VITALS
HEART RATE: 80 BPM | DIASTOLIC BLOOD PRESSURE: 66 MMHG | TEMPERATURE: 97.6 F | OXYGEN SATURATION: 98 % | SYSTOLIC BLOOD PRESSURE: 115 MMHG | BODY MASS INDEX: 28.62 KG/M2 | RESPIRATION RATE: 16 BRPM | WEIGHT: 172 LBS

## 2021-03-27 DIAGNOSIS — F25.0 SCHIZOAFFECTIVE DISORDER, BIPOLAR TYPE (HCC): Primary | ICD-10-CM

## 2021-03-27 DIAGNOSIS — R45.851 SUICIDAL IDEATIONS: ICD-10-CM

## 2021-03-27 DIAGNOSIS — F41.9 ANXIETY: ICD-10-CM

## 2021-03-27 DIAGNOSIS — F10.10 ALCOHOL ABUSE: ICD-10-CM

## 2021-03-27 DIAGNOSIS — R07.89 RIGHT-SIDED CHEST WALL PAIN: ICD-10-CM

## 2021-03-27 DIAGNOSIS — R44.1 VISUAL HALLUCINATIONS: ICD-10-CM

## 2021-03-27 DIAGNOSIS — F29 PSYCHOSES (HCC): ICD-10-CM

## 2021-03-27 PROBLEM — Z72.0 TOBACCO ABUSE: Status: ACTIVE | Noted: 2021-03-27

## 2021-03-27 PROBLEM — F14.10 COCAINE ABUSE (HCC): Status: ACTIVE | Noted: 2021-03-27

## 2021-03-27 PROBLEM — R44.3 HALLUCINATIONS: Status: ACTIVE | Noted: 2021-03-27

## 2021-03-27 PROBLEM — Z00.8 MEDICAL CLEARANCE FOR PSYCHIATRIC ADMISSION: Status: ACTIVE | Noted: 2021-03-27

## 2021-03-27 LAB
ALBUMIN SERPL BCP-MCNC: 2.9 G/DL (ref 3.5–5)
ALP SERPL-CCNC: 72 U/L (ref 46–116)
ALT SERPL W P-5'-P-CCNC: 45 U/L (ref 12–78)
ANION GAP SERPL CALCULATED.3IONS-SCNC: 11 MMOL/L (ref 4–13)
AST SERPL W P-5'-P-CCNC: 28 U/L (ref 5–45)
ATRIAL RATE: 69 BPM
BASOPHILS # BLD AUTO: 0.02 THOUSANDS/ΜL (ref 0–0.1)
BASOPHILS NFR BLD AUTO: 0 % (ref 0–1)
BILIRUB SERPL-MCNC: 0.1 MG/DL (ref 0.2–1)
BUN SERPL-MCNC: 16 MG/DL (ref 5–25)
CALCIUM ALBUM COR SERPL-MCNC: 9.9 MG/DL (ref 8.3–10.1)
CALCIUM SERPL-MCNC: 9 MG/DL (ref 8.3–10.1)
CHLORIDE SERPL-SCNC: 107 MMOL/L (ref 100–108)
CO2 SERPL-SCNC: 26 MMOL/L (ref 21–32)
CREAT SERPL-MCNC: 1.37 MG/DL (ref 0.6–1.3)
EOSINOPHIL # BLD AUTO: 0.15 THOUSAND/ΜL (ref 0–0.61)
EOSINOPHIL NFR BLD AUTO: 3 % (ref 0–6)
ERYTHROCYTE [DISTWIDTH] IN BLOOD BY AUTOMATED COUNT: 13.2 % (ref 11.6–15.1)
GFR SERPL CREATININE-BSD FRML MDRD: 77 ML/MIN/1.73SQ M
GLUCOSE SERPL-MCNC: 109 MG/DL (ref 65–140)
HCT VFR BLD AUTO: 45.2 % (ref 36.5–49.3)
HGB BLD-MCNC: 14.6 G/DL (ref 12–17)
IMM GRANULOCYTES # BLD AUTO: 0.02 THOUSAND/UL (ref 0–0.2)
IMM GRANULOCYTES NFR BLD AUTO: 0 % (ref 0–2)
LYMPHOCYTES # BLD AUTO: 1.9 THOUSANDS/ΜL (ref 0.6–4.47)
LYMPHOCYTES NFR BLD AUTO: 40 % (ref 14–44)
MAGNESIUM SERPL-MCNC: 1.8 MG/DL (ref 1.6–2.6)
MCH RBC QN AUTO: 31.2 PG (ref 26.8–34.3)
MCHC RBC AUTO-ENTMCNC: 32.3 G/DL (ref 31.4–37.4)
MCV RBC AUTO: 97 FL (ref 82–98)
MONOCYTES # BLD AUTO: 0.49 THOUSAND/ΜL (ref 0.17–1.22)
MONOCYTES NFR BLD AUTO: 10 % (ref 4–12)
NEUTROPHILS # BLD AUTO: 2.16 THOUSANDS/ΜL (ref 1.85–7.62)
NEUTS SEG NFR BLD AUTO: 47 % (ref 43–75)
NRBC BLD AUTO-RTO: 0 /100 WBCS
P AXIS: 39 DEGREES
PLATELET # BLD AUTO: 193 THOUSANDS/UL (ref 149–390)
PLATELET # BLD AUTO: 193 THOUSANDS/UL (ref 149–390)
PMV BLD AUTO: 12.1 FL (ref 8.9–12.7)
PMV BLD AUTO: 12.1 FL (ref 8.9–12.7)
POTASSIUM SERPL-SCNC: 4.2 MMOL/L (ref 3.5–5.3)
PR INTERVAL: 140 MS
PROT SERPL-MCNC: 6.5 G/DL (ref 6.4–8.2)
QRS AXIS: -9 DEGREES
QRSD INTERVAL: 84 MS
QT INTERVAL: 426 MS
QTC INTERVAL: 456 MS
RBC # BLD AUTO: 4.68 MILLION/UL (ref 3.88–5.62)
SODIUM SERPL-SCNC: 144 MMOL/L (ref 136–145)
T WAVE AXIS: 7 DEGREES
TSH SERPL DL<=0.05 MIU/L-ACNC: 1.11 UIU/ML (ref 0.36–3.74)
VENTRICULAR RATE: 69 BPM
WBC # BLD AUTO: 4.74 THOUSAND/UL (ref 4.31–10.16)

## 2021-03-27 PROCEDURE — 93005 ELECTROCARDIOGRAM TRACING: CPT

## 2021-03-27 PROCEDURE — 93010 ELECTROCARDIOGRAM REPORT: CPT | Performed by: INTERNAL MEDICINE

## 2021-03-27 PROCEDURE — 83036 HEMOGLOBIN GLYCOSYLATED A1C: CPT | Performed by: PSYCHIATRY & NEUROLOGY

## 2021-03-27 PROCEDURE — 85610 PROTHROMBIN TIME: CPT | Performed by: PSYCHIATRY & NEUROLOGY

## 2021-03-27 PROCEDURE — 80053 COMPREHEN METABOLIC PANEL: CPT | Performed by: PHYSICIAN ASSISTANT

## 2021-03-27 PROCEDURE — 71046 X-RAY EXAM CHEST 2 VIEWS: CPT

## 2021-03-27 PROCEDURE — 85611 PROTHROMBIN TEST: CPT | Performed by: PSYCHIATRY & NEUROLOGY

## 2021-03-27 PROCEDURE — 85384 FIBRINOGEN ACTIVITY: CPT | Performed by: PSYCHIATRY & NEUROLOGY

## 2021-03-27 PROCEDURE — 85732 THROMBOPLASTIN TIME PARTIAL: CPT | Performed by: PSYCHIATRY & NEUROLOGY

## 2021-03-27 PROCEDURE — 99254 IP/OBS CNSLTJ NEW/EST MOD 60: CPT | Performed by: PHYSICIAN ASSISTANT

## 2021-03-27 PROCEDURE — 85049 AUTOMATED PLATELET COUNT: CPT | Performed by: PSYCHIATRY & NEUROLOGY

## 2021-03-27 PROCEDURE — 84443 ASSAY THYROID STIM HORMONE: CPT | Performed by: PHYSICIAN ASSISTANT

## 2021-03-27 PROCEDURE — 85670 THROMBIN TIME PLASMA: CPT | Performed by: PSYCHIATRY & NEUROLOGY

## 2021-03-27 PROCEDURE — 99222 1ST HOSP IP/OBS MODERATE 55: CPT | Performed by: PSYCHIATRY & NEUROLOGY

## 2021-03-27 PROCEDURE — 85730 THROMBOPLASTIN TIME PARTIAL: CPT | Performed by: PSYCHIATRY & NEUROLOGY

## 2021-03-27 PROCEDURE — 85025 COMPLETE CBC W/AUTO DIFF WBC: CPT | Performed by: PHYSICIAN ASSISTANT

## 2021-03-27 PROCEDURE — 83735 ASSAY OF MAGNESIUM: CPT | Performed by: PSYCHIATRY & NEUROLOGY

## 2021-03-27 RX ORDER — LORAZEPAM 2 MG/ML
2 INJECTION INTRAMUSCULAR
Status: DISCONTINUED | OUTPATIENT
Start: 2021-03-27 | End: 2021-03-28 | Stop reason: HOSPADM

## 2021-03-27 RX ORDER — RISPERIDONE 1 MG/1
1 TABLET, FILM COATED ORAL 2 TIMES DAILY
Status: DISCONTINUED | OUTPATIENT
Start: 2021-03-27 | End: 2021-03-28 | Stop reason: HOSPADM

## 2021-03-27 RX ORDER — HALOPERIDOL 5 MG/ML
5 INJECTION INTRAMUSCULAR
Status: DISCONTINUED | OUTPATIENT
Start: 2021-03-27 | End: 2021-03-28 | Stop reason: HOSPADM

## 2021-03-27 RX ORDER — ACETAMINOPHEN 325 MG/1
650 TABLET ORAL ONCE
Status: COMPLETED | OUTPATIENT
Start: 2021-03-27 | End: 2021-03-27

## 2021-03-27 RX ORDER — LORAZEPAM 2 MG/ML
2 INJECTION INTRAMUSCULAR EVERY 6 HOURS PRN
Status: DISCONTINUED | OUTPATIENT
Start: 2021-03-27 | End: 2021-03-28 | Stop reason: HOSPADM

## 2021-03-27 RX ORDER — LORAZEPAM 1 MG/1
1 TABLET ORAL EVERY 2 HOUR PRN
Status: DISCONTINUED | OUTPATIENT
Start: 2021-03-27 | End: 2021-03-28 | Stop reason: HOSPADM

## 2021-03-27 RX ORDER — RISPERIDONE 0.25 MG/1
0.25 TABLET, FILM COATED ORAL
Status: DISCONTINUED | OUTPATIENT
Start: 2021-03-27 | End: 2021-03-28 | Stop reason: HOSPADM

## 2021-03-27 RX ORDER — BENZTROPINE MESYLATE 1 MG/1
1 TABLET ORAL 2 TIMES DAILY
Status: DISCONTINUED | OUTPATIENT
Start: 2021-03-27 | End: 2021-03-28 | Stop reason: HOSPADM

## 2021-03-27 RX ORDER — LIDOCAINE 50 MG/G
1 PATCH TOPICAL DAILY
Status: DISCONTINUED | OUTPATIENT
Start: 2021-03-27 | End: 2021-03-28 | Stop reason: HOSPADM

## 2021-03-27 RX ORDER — ACETAMINOPHEN 325 MG/1
650 TABLET ORAL EVERY 6 HOURS PRN
Status: DISCONTINUED | OUTPATIENT
Start: 2021-03-27 | End: 2021-03-28 | Stop reason: HOSPADM

## 2021-03-27 RX ORDER — HYDROXYZINE HYDROCHLORIDE 25 MG/1
25 TABLET, FILM COATED ORAL
Status: DISCONTINUED | OUTPATIENT
Start: 2021-03-27 | End: 2021-03-28 | Stop reason: HOSPADM

## 2021-03-27 RX ORDER — HALOPERIDOL 5 MG/ML
2.5 INJECTION INTRAMUSCULAR
Status: DISCONTINUED | OUTPATIENT
Start: 2021-03-27 | End: 2021-03-28 | Stop reason: HOSPADM

## 2021-03-27 RX ORDER — CYCLOBENZAPRINE HCL 10 MG
10 TABLET ORAL 3 TIMES DAILY PRN
Status: DISCONTINUED | OUTPATIENT
Start: 2021-03-27 | End: 2021-03-28 | Stop reason: HOSPADM

## 2021-03-27 RX ORDER — RISPERIDONE 1 MG/1
1 TABLET, ORALLY DISINTEGRATING ORAL
Status: DISCONTINUED | OUTPATIENT
Start: 2021-03-27 | End: 2021-03-28 | Stop reason: HOSPADM

## 2021-03-27 RX ORDER — TRAZODONE HYDROCHLORIDE 50 MG/1
50 TABLET ORAL
Status: DISCONTINUED | OUTPATIENT
Start: 2021-03-27 | End: 2021-03-28 | Stop reason: HOSPADM

## 2021-03-27 RX ORDER — DIPHENHYDRAMINE HYDROCHLORIDE 50 MG/ML
50 INJECTION INTRAMUSCULAR; INTRAVENOUS EVERY 6 HOURS PRN
Status: DISCONTINUED | OUTPATIENT
Start: 2021-03-27 | End: 2021-03-28 | Stop reason: HOSPADM

## 2021-03-27 RX ORDER — BENZTROPINE MESYLATE 1 MG/ML
0.5 INJECTION INTRAMUSCULAR; INTRAVENOUS
Status: DISCONTINUED | OUTPATIENT
Start: 2021-03-27 | End: 2021-03-28 | Stop reason: HOSPADM

## 2021-03-27 RX ORDER — BENZTROPINE MESYLATE 1 MG/ML
1 INJECTION INTRAMUSCULAR; INTRAVENOUS
Status: DISCONTINUED | OUTPATIENT
Start: 2021-03-27 | End: 2021-03-28 | Stop reason: HOSPADM

## 2021-03-27 RX ORDER — FOLIC ACID 1 MG/1
1 TABLET ORAL DAILY
Status: DISCONTINUED | OUTPATIENT
Start: 2021-03-27 | End: 2021-03-28 | Stop reason: HOSPADM

## 2021-03-27 RX ORDER — MULTIVITAMIN/IRON/FOLIC ACID 18MG-0.4MG
1 TABLET ORAL DAILY
Status: DISCONTINUED | OUTPATIENT
Start: 2021-03-27 | End: 2021-03-28 | Stop reason: HOSPADM

## 2021-03-27 RX ORDER — ACETAMINOPHEN 325 MG/1
650 TABLET ORAL EVERY 4 HOURS PRN
Status: DISCONTINUED | OUTPATIENT
Start: 2021-03-27 | End: 2021-03-28 | Stop reason: HOSPADM

## 2021-03-27 RX ORDER — RISPERIDONE 2 MG/1
2 TABLET, ORALLY DISINTEGRATING ORAL
Status: DISCONTINUED | OUTPATIENT
Start: 2021-03-27 | End: 2021-03-28 | Stop reason: HOSPADM

## 2021-03-27 RX ORDER — HYDROXYZINE 50 MG/1
50 TABLET, FILM COATED ORAL
Status: DISCONTINUED | OUTPATIENT
Start: 2021-03-27 | End: 2021-03-28 | Stop reason: HOSPADM

## 2021-03-27 RX ORDER — NICOTINE 21 MG/24HR
1 PATCH, TRANSDERMAL 24 HOURS TRANSDERMAL DAILY
Status: DISCONTINUED | OUTPATIENT
Start: 2021-03-27 | End: 2021-03-28 | Stop reason: HOSPADM

## 2021-03-27 RX ORDER — RISPERIDONE 0.5 MG/1
0.5 TABLET, ORALLY DISINTEGRATING ORAL
Status: DISCONTINUED | OUTPATIENT
Start: 2021-03-27 | End: 2021-03-28 | Stop reason: HOSPADM

## 2021-03-27 RX ORDER — LORAZEPAM 2 MG/ML
1 INJECTION INTRAMUSCULAR
Status: DISCONTINUED | OUTPATIENT
Start: 2021-03-27 | End: 2021-03-28 | Stop reason: HOSPADM

## 2021-03-27 RX ORDER — GABAPENTIN 400 MG/1
400 CAPSULE ORAL 3 TIMES DAILY
Status: DISCONTINUED | OUTPATIENT
Start: 2021-03-27 | End: 2021-03-28 | Stop reason: HOSPADM

## 2021-03-27 RX ORDER — ACETAMINOPHEN 325 MG/1
975 TABLET ORAL EVERY 6 HOURS PRN
Status: DISCONTINUED | OUTPATIENT
Start: 2021-03-27 | End: 2021-03-28 | Stop reason: HOSPADM

## 2021-03-27 RX ORDER — BENZTROPINE MESYLATE 1 MG/1
1 TABLET ORAL
Status: DISCONTINUED | OUTPATIENT
Start: 2021-03-27 | End: 2021-03-28 | Stop reason: HOSPADM

## 2021-03-27 RX ORDER — LANOLIN ALCOHOL/MO/W.PET/CERES
100 CREAM (GRAM) TOPICAL DAILY
Status: DISCONTINUED | OUTPATIENT
Start: 2021-03-27 | End: 2021-03-28 | Stop reason: HOSPADM

## 2021-03-27 RX ORDER — HYDROXYZINE 50 MG/1
100 TABLET, FILM COATED ORAL
Status: DISCONTINUED | OUTPATIENT
Start: 2021-03-27 | End: 2021-03-28 | Stop reason: HOSPADM

## 2021-03-27 RX ADMIN — RISPERIDONE 1 MG: 1 TABLET ORAL at 09:33

## 2021-03-27 RX ADMIN — Medication 1 TABLET: at 11:31

## 2021-03-27 RX ADMIN — FOLIC ACID 1 MG: 1 TABLET ORAL at 11:31

## 2021-03-27 RX ADMIN — GABAPENTIN 400 MG: 400 CAPSULE ORAL at 21:31

## 2021-03-27 RX ADMIN — ACETAMINOPHEN 975 MG: 325 TABLET, FILM COATED ORAL at 21:30

## 2021-03-27 RX ADMIN — CYCLOBENZAPRINE HYDROCHLORIDE 10 MG: 10 TABLET, FILM COATED ORAL at 16:32

## 2021-03-27 RX ADMIN — GABAPENTIN 400 MG: 400 CAPSULE ORAL at 09:33

## 2021-03-27 RX ADMIN — TRAZODONE HYDROCHLORIDE 50 MG: 50 TABLET ORAL at 01:53

## 2021-03-27 RX ADMIN — GABAPENTIN 400 MG: 400 CAPSULE ORAL at 16:32

## 2021-03-27 RX ADMIN — TRAZODONE HYDROCHLORIDE 50 MG: 50 TABLET ORAL at 21:54

## 2021-03-27 RX ADMIN — BENZTROPINE MESYLATE 1 MG: 1 TABLET ORAL at 09:33

## 2021-03-27 RX ADMIN — THIAMINE HCL TAB 100 MG 100 MG: 100 TAB at 11:31

## 2021-03-27 RX ADMIN — BENZTROPINE MESYLATE 1 MG: 1 TABLET ORAL at 17:14

## 2021-03-27 RX ADMIN — ACETAMINOPHEN 975 MG: 325 TABLET, FILM COATED ORAL at 12:13

## 2021-03-27 RX ADMIN — HYDROXYZINE HYDROCHLORIDE 100 MG: 50 TABLET, FILM COATED ORAL at 10:22

## 2021-03-27 RX ADMIN — ACETAMINOPHEN 650 MG: 325 TABLET, FILM COATED ORAL at 01:53

## 2021-03-27 RX ADMIN — RISPERIDONE 1 MG: 1 TABLET ORAL at 17:14

## 2021-03-27 RX ADMIN — LIDOCAINE 1 PATCH: 50 PATCH CUTANEOUS at 09:02

## 2021-03-27 RX ADMIN — LORAZEPAM 1 MG: 1 TABLET ORAL at 21:31

## 2021-03-27 NOTE — PROGRESS NOTES
Pt reported 10/10 pain in his R shoulder  PRN Flexeril given as it is too soon for another dose of Tylenol  Pt reported Flexeril ineffective upon follow up

## 2021-03-27 NOTE — PLAN OF CARE
New admission        Problem: SELF HARM/SUICIDALITY  Goal: Will have no self-injury during hospital stay  Description: INTERVENTIONS:  - Q 15 MINUTES: Routine safety checks  - Q WAKING SHIFT & PRN: Assess risk to determine if routine checks are adequate to maintain patient safety  - Encourage patient to participate actively in care by formulating a plan to combat response to suicidal ideation, identify supports and resources  Outcome: Progressing     Problem: DEPRESSION  Goal: Will be euthymic at discharge  Description: INTERVENTIONS:  - Administer medication as ordered  - Provide emotional support via 1:1 interaction with staff  - Encourage involvement in milieu/groups/activities  - Monitor for social isolation  Outcome: Progressing     Problem: ANXIETY  Goal: Will report anxiety at manageable levels  Description: INTERVENTIONS:  - Administer medication as ordered  - Teach and encourage coping skills  - Provide emotional support  - Assess patient/family for anxiety and ability to cope  Outcome: Progressing     Problem: SUBSTANCE USE/ABUSE  Goal: Will have no detox symptoms and will verbalize plan for changing substance-related behavior  Description: INTERVENTIONS:  - Monitor physical status and assess for symptoms of withdrawal  - Administer medication as ordered  - Provide emotional support with 1 on 1 interaction with staff  - Encourage recovery focused program/ addiction education  - Assess for verbalization of changing behaviors related to substance abuse  - Initiate consults and referrals as appropriate (Case Management, Spiritual Care, etc )  Outcome: Progressing  Goal: By discharge, will develop insight into their chemical dependency and sustain motivation to continue in recovery  Description: INTERVENTIONS:  - Attends all daily group sessions and scheduled AA groups  - Actively practices coping skills through participation in the therapeutic community and adherence to program rules  - Reviews and completes assignments from individual treatment plan  - Assist patient development of understanding of their personal cycle of addiction and relapse triggers  Outcome: Progressing  Goal: By discharge, patient will have ongoing treatment plan addressing chemical dependency  Description: INTERVENTIONS:  - Assist patient with resources and/or appointments for ongoing recovery based living  Outcome: Progressing

## 2021-03-27 NOTE — ED NOTES
Transportation is arranged with [facility]  Transportation is scheduled for 0100 3/27/2021  Patient may go to the floor at two hours before/after another pt         Nurse report is to be called to [phone] prior to patient transfer

## 2021-03-27 NOTE — PROGRESS NOTES
Clothing  1- T-Shirt (short sleeve/gray)  1- Long Sleeve Shirt (gray)  1- Pair shorts (gray w/strings)  1- Pair sweat pant (black)  2- Pair socks (one gray and one black)  1- Pair Nike Sneakers (White/red/gray/black)    Outerwear  1- Fleece Jacket (Roche/black/white)    Valuables/Miscellaneous  1- Wallet (Black)     - Store Discount cards  1-Keys  1-Headphones  1-Insurance Card  1- [place] state ID  1-[place] ID Card  1- Insurance Card  1-[employer] Dot Debit Card  1- Zig Zag Wraps  1-Summons  1- APD list of personal items    Partial Dentures placed inside of black luis carlos glasses case, per patients request    Toiletries  1-Tooth brush  3- Bars of soap  1- Toothpaste  1-Shampoo (mini)  1-Conditioner(mini)  1-Lotion (mini)    Food  1- container of chocolate brownies  1- 1/3 container of  vanilla cake with chocolate  1- container of cookies  1-ritzs bits cookies

## 2021-03-27 NOTE — ED NOTES
Patient is awake and alert  Vital signs taken  Voiding prior to transfer       Hannah Boston RN  03/27/21 8022

## 2021-03-27 NOTE — PROGRESS NOTES
Pt approached this writer stating he was having an increase in anxiety awaiting results from xray  Reports [facility] stating he denied earlier because "If I told you no then I was hoping I could ignore it and they would go "  Pt denies being command however states "they stress me out"  Pt expresses having pain on inhalation, stating it hurt too much to take deep breath  Pt noted to have shallow breaths  Encouraged pt to take slow breaths as pt was sitting forward in seat, anxious and appearing in panic when discussing awaiting for results from xray  Reassured pt would be notified of results as soon as the doctor reviews xray and results would be addressed  Pt asking "you sure Im in a good place?" pt reassured  Pt on phone calling HR at his job to notify them of his hospitalization
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
## 2021-03-27 NOTE — QUICK NOTE
Medication note:  Pt received trazodone for insomnia at 0154  Patient was restless for awhile until pain subsided  Pt observed sleeping by 0300 and was able to remain asleep  Will continue to monitor and give support

## 2021-03-27 NOTE — QUICK NOTE
XR chest PA and lateral reviewed - no evidence of acute abnormalities  Does have emphysematous changes likely related to tobacco abuse  Suspect pain is likely musculoskeletal   Continue lidocaine patch, ice, and tylenol PRN  Discussed with nursing
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
## 2021-03-27 NOTE — PLAN OF CARE
Problem: SELF HARM/SUICIDALITY  Goal: Will have no self-injury during hospital stay  Description: INTERVENTIONS:  - Q 15 MINUTES: Routine safety checks  - Q WAKING SHIFT & PRN: Assess risk to determine if routine checks are adequate to maintain patient safety  - Encourage patient to participate actively in care by formulating a plan to combat response to suicidal ideation, identify supports and resources  Outcome: Progressing     Problem: DEPRESSION  Goal: Will be euthymic at discharge  Description: INTERVENTIONS:  - Administer medication as ordered  - Provide emotional support via 1:1 interaction with staff  - Encourage involvement in milieu/groups/activities  - Monitor for social isolation  Outcome: Progressing     Problem: PSYCHOSIS  Goal: Will report no hallucinations or delusions  Description: Interventions:  - Administer medication as  ordered  - Every waking shifts and PRN assess for the presence of hallucinations and or delusions  - Assist with reality testing to support increasing orientation  - Assess if patient's hallucinations or delusions are encouraging self-harm or harm to others and intervene as appropriate  Outcome: Progressing     Problem: ANXIETY  Goal: Will report anxiety at manageable levels  Description: INTERVENTIONS:  - Administer medication as ordered  - Teach and encourage coping skills  - Provide emotional support  - Assess patient/family for anxiety and ability to cope  Outcome: Progressing     Problem: SUBSTANCE USE/ABUSE  Goal: Will have no detox symptoms and will verbalize plan for changing substance-related behavior  Description: INTERVENTIONS:  - Monitor physical status and assess for symptoms of withdrawal  - Administer medication as ordered  - Provide emotional support with 1 on 1 interaction with staff  - Encourage recovery focused program/ addiction education  - Assess for verbalization of changing behaviors related to substance abuse  - Initiate consults and referrals as appropriate (Case Management, Spiritual Care, etc )  Outcome: Progressing  Goal: By discharge, will develop insight into their chemical dependency and sustain motivation to continue in recovery  Description: INTERVENTIONS:  - Attends all daily group sessions and scheduled AA groups  - Actively practices coping skills through participation in the therapeutic community and adherence to program rules  - Reviews and completes assignments from individual treatment plan  - Assist patient development of understanding of their personal cycle of addiction and relapse triggers  Outcome: Progressing  Goal: By discharge, patient will have ongoing treatment plan addressing chemical dependency  Description: INTERVENTIONS:  - Assist patient with resources and/or appointments for ongoing recovery based living  Outcome: Progressing     Problem: SLEEP DISTURBANCE  Goal: Will exhibit normal sleeping pattern  Description: Interventions:  -  Assess the patients sleep pattern, noting recent changes  - Administer medication as ordered  - Decrease environmental stimuli, including noise, as appropriate during the night  - Encourage the patient to actively participate in unit groups and or exercise during the day to enhance ability to achieve adequate sleep at night  - Assess the patient, in the morning, encouraging a description of sleep experience  Outcome: Progressing     Problem: PAIN - ADULT  Goal: Verbalizes/displays adequate comfort level or baseline comfort level  Description: Interventions:  - Encourage patient to monitor pain and request assistance  - Assess pain using appropriate pain scale  - Administer analgesics based on type and severity of pain and evaluate response  - Implement non-pharmacological measures as appropriate and evaluate response  - Consider cultural and social influences on pain and pain management  - Notify physician/advanced practitioner if interventions unsuccessful or patient reports new pain  Outcome: Progressing

## 2021-03-27 NOTE — PROGRESS NOTES
Pt is lying in his bed with his eyes closed at the time of interview  Affect is flat and pt is scant in his responses  Pt denies SI/HI/AVH, but reports to this writer 10/10 right shoulder pain  Pt demonstrated that he is able to move his right arm  Pt also denies anxiety and depression  Pt denies having any other questions or concerns
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
## 2021-03-27 NOTE — ASSESSMENT & PLAN NOTE
· Vital signs stable at time of assessment  · Check CBC, CMP, TSH  · Patient appears medically stable at this time for inpatient psychiatric treatment

## 2021-03-28 ENCOUNTER — HOSPITAL ENCOUNTER (OUTPATIENT)
Facility: HOSPITAL | Age: 36
Setting detail: OBSERVATION
End: 2021-03-29
Attending: EMERGENCY MEDICINE | Admitting: INTERNAL MEDICINE
Payer: COMMERCIAL

## 2021-03-28 ENCOUNTER — APPOINTMENT (EMERGENCY)
Dept: CT IMAGING | Facility: HOSPITAL | Age: 36
End: 2021-03-28
Payer: COMMERCIAL

## 2021-03-28 VITALS
TEMPERATURE: 98.2 F | BODY MASS INDEX: 28.69 KG/M2 | OXYGEN SATURATION: 95 % | DIASTOLIC BLOOD PRESSURE: 60 MMHG | SYSTOLIC BLOOD PRESSURE: 114 MMHG | WEIGHT: 172.2 LBS | HEART RATE: 68 BPM | RESPIRATION RATE: 20 BRPM | HEIGHT: 65 IN

## 2021-03-28 DIAGNOSIS — S20.211A CONTUSION OF RIGHT CHEST WALL, INITIAL ENCOUNTER: Primary | ICD-10-CM

## 2021-03-28 DIAGNOSIS — J43.9 BULLA OF LUNG (HCC): ICD-10-CM

## 2021-03-28 DIAGNOSIS — R44.3 HALLUCINATIONS: ICD-10-CM

## 2021-03-28 PROBLEM — K59.00 CONSTIPATION: Status: ACTIVE | Noted: 2021-03-28

## 2021-03-28 PROBLEM — F10.10 ALCOHOL ABUSE: Status: ACTIVE | Noted: 2021-03-28

## 2021-03-28 LAB
ANION GAP SERPL CALCULATED.3IONS-SCNC: 9 MMOL/L (ref 4–13)
APTT PPP: 29 SECONDS (ref 23–37)
APTT PPP: 29 SECONDS (ref 23–37)
BUN SERPL-MCNC: 14 MG/DL (ref 5–25)
CALCIUM SERPL-MCNC: 8.8 MG/DL (ref 8.3–10.1)
CHLORIDE SERPL-SCNC: 107 MMOL/L (ref 100–108)
CO2 SERPL-SCNC: 28 MMOL/L (ref 21–32)
CREAT SERPL-MCNC: 1.16 MG/DL (ref 0.6–1.3)
EST. AVERAGE GLUCOSE BLD GHB EST-MCNC: 105 MG/DL
FIBRINOGEN PPP-MCNC: 312 MG/DL (ref 227–495)
GFR SERPL CREATININE-BSD FRML MDRD: 94 ML/MIN/1.73SQ M
GLUCOSE SERPL-MCNC: 112 MG/DL (ref 65–140)
HBA1C MFR BLD: 5.3 %
INR PPP: 0.89 (ref 0.84–1.19)
POTASSIUM SERPL-SCNC: 4.1 MMOL/L (ref 3.5–5.3)
PROTHROMBIN TIME: 11.7 SECONDS (ref 11.6–14.5)
PROTHROMBIN TIME: 12 SECONDS (ref 11.6–14.5)
SODIUM SERPL-SCNC: 144 MMOL/L (ref 136–145)
THROMBIN TIME: 15 SECONDS (ref 14.7–18.4)
THROMBIN TIME: 15.7 SECONDS (ref 14.7–18.4)

## 2021-03-28 PROCEDURE — 80048 BASIC METABOLIC PNL TOTAL CA: CPT | Performed by: EMERGENCY MEDICINE

## 2021-03-28 PROCEDURE — 99233 SBSQ HOSP IP/OBS HIGH 50: CPT | Performed by: PSYCHIATRY & NEUROLOGY

## 2021-03-28 PROCEDURE — 74177 CT ABD & PELVIS W/CONTRAST: CPT

## 2021-03-28 PROCEDURE — 99285 EMERGENCY DEPT VISIT HI MDM: CPT

## 2021-03-28 PROCEDURE — G1004 CDSM NDSC: HCPCS

## 2021-03-28 PROCEDURE — 36415 COLL VENOUS BLD VENIPUNCTURE: CPT | Performed by: EMERGENCY MEDICINE

## 2021-03-28 PROCEDURE — 99285 EMERGENCY DEPT VISIT HI MDM: CPT | Performed by: EMERGENCY MEDICINE

## 2021-03-28 PROCEDURE — 71260 CT THORAX DX C+: CPT

## 2021-03-28 PROCEDURE — 99220 PR INITIAL OBSERVATION CARE/DAY 70 MINUTES: CPT | Performed by: INTERNAL MEDICINE

## 2021-03-28 PROCEDURE — NC001 PR NO CHARGE: Performed by: PSYCHIATRY & NEUROLOGY

## 2021-03-28 RX ORDER — GABAPENTIN 400 MG/1
400 CAPSULE ORAL 3 TIMES DAILY
Refills: 0
Start: 2021-03-28 | End: 2021-04-07 | Stop reason: HOSPADM

## 2021-03-28 RX ORDER — LANOLIN ALCOHOL/MO/W.PET/CERES
100 CREAM (GRAM) TOPICAL DAILY
Status: DISCONTINUED | OUTPATIENT
Start: 2021-03-29 | End: 2021-03-29 | Stop reason: HOSPADM

## 2021-03-28 RX ORDER — FOLIC ACID 1 MG/1
1 TABLET ORAL DAILY
Status: DISCONTINUED | OUTPATIENT
Start: 2021-03-29 | End: 2021-03-29 | Stop reason: HOSPADM

## 2021-03-28 RX ORDER — RISPERIDONE 1 MG/1
1 TABLET, FILM COATED ORAL 2 TIMES DAILY
Status: DISCONTINUED | OUTPATIENT
Start: 2021-03-28 | End: 2021-03-29 | Stop reason: HOSPADM

## 2021-03-28 RX ORDER — LANOLIN ALCOHOL/MO/W.PET/CERES
6 CREAM (GRAM) TOPICAL
Status: DISCONTINUED | OUTPATIENT
Start: 2021-03-28 | End: 2021-03-29 | Stop reason: HOSPADM

## 2021-03-28 RX ORDER — LIDOCAINE 50 MG/G
1 PATCH TOPICAL ONCE
Status: COMPLETED | OUTPATIENT
Start: 2021-03-28 | End: 2021-03-29

## 2021-03-28 RX ORDER — DOCUSATE SODIUM 100 MG/1
100 CAPSULE, LIQUID FILLED ORAL 2 TIMES DAILY
Status: DISCONTINUED | OUTPATIENT
Start: 2021-03-28 | End: 2021-03-29 | Stop reason: HOSPADM

## 2021-03-28 RX ORDER — MULTIVITAMIN/IRON/FOLIC ACID 18MG-0.4MG
1 TABLET ORAL DAILY
Status: DISCONTINUED | OUTPATIENT
Start: 2021-03-29 | End: 2021-03-29 | Stop reason: HOSPADM

## 2021-03-28 RX ORDER — ACETAMINOPHEN 325 MG/1
650 TABLET ORAL ONCE
Status: COMPLETED | OUTPATIENT
Start: 2021-03-28 | End: 2021-03-28

## 2021-03-28 RX ORDER — NICOTINE 21 MG/24HR
1 PATCH, TRANSDERMAL 24 HOURS TRANSDERMAL DAILY
Status: DISCONTINUED | OUTPATIENT
Start: 2021-03-28 | End: 2021-03-29 | Stop reason: HOSPADM

## 2021-03-28 RX ORDER — MORPHINE SULFATE 4 MG/ML
4 INJECTION, SOLUTION INTRAMUSCULAR; INTRAVENOUS ONCE
Status: COMPLETED | OUTPATIENT
Start: 2021-03-28 | End: 2021-03-28

## 2021-03-28 RX ORDER — CYCLOBENZAPRINE HCL 10 MG
10 TABLET ORAL 3 TIMES DAILY PRN
Refills: 0
Start: 2021-03-28 | End: 2021-04-07 | Stop reason: HOSPADM

## 2021-03-28 RX ORDER — METHOCARBAMOL 500 MG/1
500 TABLET, FILM COATED ORAL EVERY 6 HOURS PRN
Status: DISCONTINUED | OUTPATIENT
Start: 2021-03-28 | End: 2021-03-29 | Stop reason: HOSPADM

## 2021-03-28 RX ORDER — HYDROMORPHONE HCL/PF 1 MG/ML
0.5 SYRINGE (ML) INJECTION EVERY 6 HOURS PRN
Status: DISCONTINUED | OUTPATIENT
Start: 2021-03-28 | End: 2021-03-29

## 2021-03-28 RX ORDER — POLYETHYLENE GLYCOL 3350 17 G/17G
17 POWDER, FOR SOLUTION ORAL 2 TIMES DAILY WITH MEALS
Status: DISCONTINUED | OUTPATIENT
Start: 2021-03-28 | End: 2021-03-29 | Stop reason: HOSPADM

## 2021-03-28 RX ORDER — ACETAMINOPHEN 325 MG/1
650 TABLET ORAL EVERY 6 HOURS PRN
Status: DISCONTINUED | OUTPATIENT
Start: 2021-03-28 | End: 2021-03-28

## 2021-03-28 RX ORDER — GABAPENTIN 400 MG/1
400 CAPSULE ORAL 3 TIMES DAILY
Status: DISCONTINUED | OUTPATIENT
Start: 2021-03-28 | End: 2021-03-28

## 2021-03-28 RX ORDER — RISPERIDONE 1 MG/1
1 TABLET, FILM COATED ORAL 2 TIMES DAILY
Refills: 0
Start: 2021-03-28 | End: 2021-04-07 | Stop reason: HOSPADM

## 2021-03-28 RX ORDER — GABAPENTIN 400 MG/1
400 CAPSULE ORAL 3 TIMES DAILY
Status: DISCONTINUED | OUTPATIENT
Start: 2021-03-28 | End: 2021-03-29 | Stop reason: HOSPADM

## 2021-03-28 RX ORDER — ACETAMINOPHEN 325 MG/1
975 TABLET ORAL 3 TIMES DAILY
Status: DISCONTINUED | OUTPATIENT
Start: 2021-03-28 | End: 2021-03-29 | Stop reason: HOSPADM

## 2021-03-28 RX ADMIN — NICOTINE 1 PATCH: 21 PATCH, EXTENDED RELEASE TRANSDERMAL at 14:15

## 2021-03-28 RX ADMIN — LIDOCAINE 1 PATCH: 50 PATCH TOPICAL at 12:15

## 2021-03-28 RX ADMIN — MORPHINE SULFATE 4 MG: 4 INJECTION INTRAVENOUS at 10:18

## 2021-03-28 RX ADMIN — IOHEXOL 100 ML: 350 INJECTION, SOLUTION INTRAVENOUS at 11:05

## 2021-03-28 RX ADMIN — RISPERIDONE 1 MG: 1 TABLET ORAL at 09:02

## 2021-03-28 RX ADMIN — Medication 1 TABLET: at 09:02

## 2021-03-28 RX ADMIN — GABAPENTIN 400 MG: 400 CAPSULE ORAL at 22:32

## 2021-03-28 RX ADMIN — DOCUSATE SODIUM 100 MG: 100 CAPSULE, LIQUID FILLED ORAL at 18:29

## 2021-03-28 RX ADMIN — THIAMINE HCL TAB 100 MG 100 MG: 100 TAB at 09:02

## 2021-03-28 RX ADMIN — LIDOCAINE 1 PATCH: 50 PATCH CUTANEOUS at 09:05

## 2021-03-28 RX ADMIN — ACETAMINOPHEN 650 MG: 325 TABLET, FILM COATED ORAL at 12:06

## 2021-03-28 RX ADMIN — SODIUM CHLORIDE 1000 ML: 0.9 INJECTION, SOLUTION INTRAVENOUS at 14:15

## 2021-03-28 RX ADMIN — ACETAMINOPHEN 975 MG: 325 TABLET, FILM COATED ORAL at 22:31

## 2021-03-28 RX ADMIN — POLYETHYLENE GLYCOL 3350 17 G: 17 POWDER, FOR SOLUTION ORAL at 16:13

## 2021-03-28 RX ADMIN — HYDROMORPHONE HYDROCHLORIDE 0.5 MG: 1 INJECTION, SOLUTION INTRAMUSCULAR; INTRAVENOUS; SUBCUTANEOUS at 18:30

## 2021-03-28 RX ADMIN — MELATONIN TAB 3 MG 6 MG: 3 TAB at 23:32

## 2021-03-28 RX ADMIN — ACETAMINOPHEN 650 MG: 325 TABLET, FILM COATED ORAL at 16:03

## 2021-03-28 RX ADMIN — BENZTROPINE MESYLATE 1 MG: 1 TABLET ORAL at 09:02

## 2021-03-28 RX ADMIN — GABAPENTIN 400 MG: 400 CAPSULE ORAL at 18:15

## 2021-03-28 RX ADMIN — RISPERIDONE 1 MG: 1 TABLET ORAL at 18:29

## 2021-03-28 RX ADMIN — HYDROMORPHONE HYDROCHLORIDE 0.5 MG: 1 INJECTION, SOLUTION INTRAMUSCULAR; INTRAVENOUS; SUBCUTANEOUS at 22:32

## 2021-03-28 RX ADMIN — SODIUM CHLORIDE 1000 ML: 0.9 INJECTION, SOLUTION INTRAVENOUS at 10:22

## 2021-03-28 RX ADMIN — FOLIC ACID 1 MG: 1 TABLET ORAL at 09:02

## 2021-03-28 RX ADMIN — METHOCARBAMOL 500 MG: 500 TABLET ORAL at 22:32

## 2021-03-28 RX ADMIN — METHOCARBAMOL 500 MG: 500 TABLET ORAL at 16:03

## 2021-03-28 RX ADMIN — GABAPENTIN 400 MG: 400 CAPSULE ORAL at 09:02

## 2021-03-28 NOTE — ASSESSMENT & PLAN NOTE
· Originally presented to the emergency department due to suicidal ideation and hallucinations  · Still with command auditory hallucinations to kill himself, ongoing paranoia and anxiety  · Continue 1:1 observation as patient cannot contract for safety  · Consult psychiatry - anticipate return once medically cleared, possibly tomorrow    Discussed with psychiatry, continue risperdal 1 mg BID and gabapentin 400 mg TID

## 2021-03-28 NOTE — TREATMENT TEAM
03/28/21 0800   Team Meeting   Meeting Type Daily Rounds   Team Members Present   Team Members Present Physician;Nurse   Physician Team Member Dr Ancelmo Mina   Patient/Family Present   Patient Present No   Patient's Family Present No     AM rounds: C/o right flank pain (XR-)  Reports VH, paranoid delusions and increased anxiety (Atarax and Ativan effective), guarded w/ staff/peers  Slept
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
## 2021-03-28 NOTE — PROGRESS NOTES
Progress Note - Behavioral Health     [facility] 31 y o  male MRN: @MRN   Unit/Bed#: [facility] 251-02 Encounter: [id number]    Per nursing report and sign out, patient still paranoid, VH  Still having pain, PA aware and managing  Anxiety ongoing issue  PRNs required  Slept overnight  [facility] reports today that still has a lot of pain  Feels depression, anxiety, paranoia an ongoing issue  AH more than VH lately, CAH to kill self  No side effects of meds  Cannot contract for safety, feels a lot of guilt about behaviors, not a lot of hope, support  Did not state a plan, but was not articulating safety and delayed responses upon several times exploring in conversation  Nothing physical other than pain today       Energy: low  Sleep: ok overall  Appetite: a bit low  Medication side effects: No   ROS: all other systems are negative    Mental Status Evaluation:    Appearance:  dressed casually   Behavior:  Pleasant & cooperative, guarded, poor eye contact   Speech:  soft, paucity of speech   Mood:  depressed and anxious   Affect:  mood congruent       Thought Process:  Linear and goal directed   Associations: intact associations   Thought Content:  negative thinking and cognitive distortions, paranoid ideation   Perceptual Disturbances: auditory hallucinations, visual hallucinations   Risk Potential: Suicidal ideation - yes, no plan stated but could not contract for safety  Homicidal ideation - None  Potential for aggression - No   Sensorium:  A&Ox3   Cognition:  grossly intact   Consciousness:  Alert & Awake   Memory:  recent and remote memory grossly intact   Attention: attention span and concentration are age appropriate           Insight:  limited   Judgment: limited   Muscle Strength  Muscle Tone normal  normal   Gait/Station: slow   Motor Activity: no abnormal movements       Vital signs in last 24 hours:    Temp:  [97 °F (36 1 °C)-98 7 °F (37 1 °C)] 98 2 °F (36 8 °C)  HR:  [] 68  Resp:  [15-20] 20  BP: (110-120)/(54-68) 114/60    Laboratory results:  I have personally reviewed all pertinent laboratory/tests results  Assessment/Plan   Principal Problem:    Schizoaffective disorder, bipolar type ([facility] )  Active Problems:    Medical clearance for psychiatric admission    Right-sided chest wall pain    Tobacco abuse    Cocaine abuse ([facility] )    Hallucinations    Anxiety      Jane Antunez is not improving, and I am concerned enough for safety that I will initiate a 1:1 visual      Recommended Treatment:     Planned medication and treatment changes: All current active medications have been reviewed    Encourage group therapy, milieu therapy and occupational therapy  Behavioral Health checks as unit standard unless ordered or noted otherwise    Current Facility-Administered Medications   Medication Dose Route Frequency Provider Last Rate    acetaminophen  650 mg Oral Q6H PRN Reanna Hay MD      acetaminophen  650 mg Oral Q4H PRN Silvestre Hernandez MD      acetaminophen  975 mg Oral Q6H PRN Reanna Hay MD      haloperidol lactate  2 5 mg Intramuscular Q4H PRN Max 4/day Reanna Hay MD      And    LORazepam  1 mg Intramuscular Q4H PRN Max 4/day Reanna Hay MD      And    benztropine  0 5 mg Intramuscular Q4H PRN Max 4/day Reanna Hay MD      haloperidol lactate  2 5 mg Intramuscular Q4H PRN Max 6/day Silvestre Hernandez MD      And    LORazepam  1 mg Intramuscular Q4H PRN Max 6/day Silvestre Hernandez MD      And    benztropine  0 5 mg Intramuscular Q4H PRN Max 6/day Reanna Hay MD      haloperidol lactate  5 mg Intramuscular Q4H PRN Max 4/day Reanna Hay MD      And    LORazepam  2 mg Intramuscular Q4H PRN Max 4/day Reanna Hay MD      And    benztropine  1 mg Intramuscular Q4H PRN Max 4/day Reanna Hay MD      haloperidol lactate  5 mg Intramuscular Q4H PRN Max 4/day Silvestre Hernandez MD      And    LORazepam  2 mg Intramuscular Q4H PRN Max 4/day Reanna Hay MD      And  benztropine  1 mg Intramuscular Q4H PRN Max 4/day Joslyn Culver MD      benztropine  1 mg Oral Q4H PRN Max 6/day Wallace Hernandez MD      benztropine  1 mg Oral BID Pacheco Flood III, DO      cyclobenzaprine  10 mg Oral TID PRN Maxx Aaron PA-C      hydrOXYzine HCL  50 mg Oral Q6H PRN Max 4/day Wallace Hernandez MD      Or    diphenhydrAMINE  50 mg Intramuscular Q6H PRN Wallace Hernandez MD      folic acid  1 mg Oral Daily Pacheco Flood III, DO      gabapentin  400 mg Oral TID Pacheco Flood III, DO      hydrOXYzine HCL  100 mg Oral Q6H PRN Max 4/day Wallace Hernandez MD      Or    LORazepam  2 mg Intramuscular Q6H PRN Wallace Hernandez MD      hydrOXYzine HCL  25 mg Oral Q6H PRN Max 4/day Wallace Hernandez MD      lidocaine  1 patch Topical Daily Maxx Aaron PA-C      LORazepam  1 mg Oral Q2H PRN Pacheco Flood III, DO      multivitamin-minerals  1 tablet Oral Daily Pacheco Flood III, DO      nicotine  1 patch Transdermal Daily Wallace Hernandez MD      risperiDONE  0 5 mg Oral Q4H PRN Max 3/day Joslyn Culver MD      risperiDONE  0 5 mg Oral Q4H PRN Max 6/day Wallace Hernandez MD      risperiDONE  1 mg Oral Q4H PRN Max 6/day Wallace Hernandez MD      risperiDONE  1 mg Oral Q4H PRN Max 3/day Wallace Hernandez MD      risperiDONE  2 mg Oral Q4H PRN Max 3/day Wallace Hernandez MD      risperiDONE  0 25 mg Oral Q4H PRN Max 6/day Wallace Hernandez MD      risperiDONE  1 mg Oral BID Pacheco Flood III, DO      thiamine  100 mg Oral Daily Pacheco Flood III, DO      traZODone  50 mg Oral HS PRN Joslyn Culver MD         1) continue current treatment  2) appreciate medicine's involvment  3) Sent to ED for further evaluation/work up, ADT21 and bed hold for now  4) 1:1 visual observation ordered for safety, inability to contract for safety  5) Continue to support patient and engage them in the programs available as feasible and appropriate  Continue case management support and therapy   Continue discharge planning  Risks / Benefits of Treatment:    Risks, benefits, and possible side effects of medications explained to patient and patient verbalizes understanding and agreement for treatment  Counseling / Coordination of Care:    Medication changes reviewed with nursing staff  Medications, treatment progress and treatment plan reviewed with patient        Dylan Perez III, DO  3/28/2021  7:42 AM
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
## 2021-03-28 NOTE — QUICK NOTE
On further review of CXR concern for right sided pneumothorax which does not coincide with official radiology reading  Discussed with nursing, recommend urgent transfer to ER for further evaluation

## 2021-03-28 NOTE — ED NOTES
Insurance Authorization for admission:  (primary Insurance)   Phone call placed to [employer] ([facility])  Phone number: [phone]   Spoke to Ursula Clemente  3 days approved  Level of care: [facility]   Review on Monday 3/29     Authorization # [id number]        COB completed with Cornel Barnard at [phone]  Spoke to [facility]
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
## 2021-03-28 NOTE — PROGRESS NOTES
Per physician and PA is was requested and ordered that patient get CT of chest  At 1000 patient picked up and transported to [facility]  After meeting with patient Physician ordered continual observation-as he felt patient did not contract for safety  Patient placed on 1:1  Patient irritable at times, but cooperative  Scant in conversation  Isolates self to room  Does not attend groups and is not social with peers  For writer patient did deny SI/HI/AVH upon assessment early this a m
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
## 2021-03-28 NOTE — PROGRESS NOTES
PRN Ativan and Tylenol given at 2131 for anxiety and R shoulder pain 9/10  Patient appears to be agitated but is holding it in  Upon follow up, patient appears less agitated  Appears tired but says he cannot sleep  Patient received PRN Abigail Nunn  At 2156  Encouraged him to try to lay down  He was agreeable  He stated that his pain is the same and that his left side is beginning to hurt from laying on it  He was agreeable to use a heat pack

## 2021-03-29 ENCOUNTER — HOSPITAL ENCOUNTER (INPATIENT)
Facility: HOSPITAL | Age: 36
LOS: 9 days | DRG: 885 | End: 2021-04-07
Attending: STUDENT IN AN ORGANIZED HEALTH CARE EDUCATION/TRAINING PROGRAM | Admitting: STUDENT IN AN ORGANIZED HEALTH CARE EDUCATION/TRAINING PROGRAM
Payer: COMMERCIAL

## 2021-03-29 VITALS
TEMPERATURE: 98.2 F | HEART RATE: 68 BPM | SYSTOLIC BLOOD PRESSURE: 123 MMHG | DIASTOLIC BLOOD PRESSURE: 75 MMHG | RESPIRATION RATE: 18 BRPM | OXYGEN SATURATION: 97 %

## 2021-03-29 DIAGNOSIS — S20.211A CONTUSION OF RIGHT CHEST WALL, INITIAL ENCOUNTER: ICD-10-CM

## 2021-03-29 DIAGNOSIS — F41.9 ANXIETY: ICD-10-CM

## 2021-03-29 DIAGNOSIS — G47.00 INSOMNIA: Primary | ICD-10-CM

## 2021-03-29 DIAGNOSIS — J43.9 BULLA OF LUNG (HCC): ICD-10-CM

## 2021-03-29 DIAGNOSIS — R07.89 RIGHT-SIDED CHEST WALL PAIN: ICD-10-CM

## 2021-03-29 DIAGNOSIS — K59.01 SLOW TRANSIT CONSTIPATION: ICD-10-CM

## 2021-03-29 DIAGNOSIS — F25.0 SCHIZOAFFECTIVE DISORDER, BIPOLAR TYPE (HCC): ICD-10-CM

## 2021-03-29 PROCEDURE — 99245 OFF/OP CONSLTJ NEW/EST HI 55: CPT | Performed by: INTERNAL MEDICINE

## 2021-03-29 PROCEDURE — 99217 PR OBSERVATION CARE DISCHARGE MANAGEMENT: CPT | Performed by: PHYSICIAN ASSISTANT

## 2021-03-29 RX ORDER — LANOLIN ALCOHOL/MO/W.PET/CERES
100 CREAM (GRAM) TOPICAL DAILY
Status: CANCELLED | OUTPATIENT
Start: 2021-03-30

## 2021-03-29 RX ORDER — MULTIVITAMIN/IRON/FOLIC ACID 18MG-0.4MG
1 TABLET ORAL DAILY
Status: CANCELLED | OUTPATIENT
Start: 2021-03-30

## 2021-03-29 RX ORDER — MINERAL OIL AND PETROLATUM 150; 830 MG/G; MG/G
1 OINTMENT OPHTHALMIC
Status: CANCELLED | OUTPATIENT
Start: 2021-03-29

## 2021-03-29 RX ORDER — HALOPERIDOL 2 MG/1
2 TABLET ORAL
Status: DISCONTINUED | OUTPATIENT
Start: 2021-03-29 | End: 2021-04-07 | Stop reason: HOSPADM

## 2021-03-29 RX ORDER — LORAZEPAM 2 MG/ML
1 INJECTION INTRAMUSCULAR EVERY 4 HOURS PRN
Status: DISCONTINUED | OUTPATIENT
Start: 2021-03-29 | End: 2021-04-07 | Stop reason: HOSPADM

## 2021-03-29 RX ORDER — LORAZEPAM 2 MG/ML
1 INJECTION INTRAMUSCULAR
Status: DISCONTINUED | OUTPATIENT
Start: 2021-03-29 | End: 2021-04-02

## 2021-03-29 RX ORDER — HALOPERIDOL 5 MG
5 TABLET ORAL
Status: CANCELLED | OUTPATIENT
Start: 2021-03-29

## 2021-03-29 RX ORDER — POLYETHYLENE GLYCOL 3350 17 G/17G
17 POWDER, FOR SOLUTION ORAL DAILY PRN
Status: CANCELLED | OUTPATIENT
Start: 2021-03-29

## 2021-03-29 RX ORDER — POLYETHYLENE GLYCOL 3350 17 G/17G
17 POWDER, FOR SOLUTION ORAL 2 TIMES DAILY WITH MEALS
Status: CANCELLED | OUTPATIENT
Start: 2021-03-29

## 2021-03-29 RX ORDER — LORAZEPAM 2 MG/ML
2 INJECTION INTRAMUSCULAR
Status: DISCONTINUED | OUTPATIENT
Start: 2021-03-29 | End: 2021-04-07 | Stop reason: HOSPADM

## 2021-03-29 RX ORDER — BENZTROPINE MESYLATE 1 MG/1
1 TABLET ORAL
Status: CANCELLED | OUTPATIENT
Start: 2021-03-29

## 2021-03-29 RX ORDER — MAGNESIUM HYDROXIDE/ALUMINUM HYDROXICE/SIMETHICONE 120; 1200; 1200 MG/30ML; MG/30ML; MG/30ML
30 SUSPENSION ORAL EVERY 4 HOURS PRN
Status: CANCELLED | OUTPATIENT
Start: 2021-03-29

## 2021-03-29 RX ORDER — DOCUSATE SODIUM 100 MG/1
100 CAPSULE, LIQUID FILLED ORAL 2 TIMES DAILY
Status: DISCONTINUED | OUTPATIENT
Start: 2021-03-29 | End: 2021-04-07 | Stop reason: HOSPADM

## 2021-03-29 RX ORDER — LANOLIN ALCOHOL/MO/W.PET/CERES
6 CREAM (GRAM) TOPICAL
Status: DISCONTINUED | OUTPATIENT
Start: 2021-03-29 | End: 2021-04-07 | Stop reason: HOSPADM

## 2021-03-29 RX ORDER — HALOPERIDOL 0.5 MG/1
2 TABLET ORAL
Status: CANCELLED | OUTPATIENT
Start: 2021-03-29

## 2021-03-29 RX ORDER — HYDROXYZINE 50 MG/1
50 TABLET, FILM COATED ORAL
Status: DISCONTINUED | OUTPATIENT
Start: 2021-03-29 | End: 2021-04-07 | Stop reason: HOSPADM

## 2021-03-29 RX ORDER — RISPERIDONE 1 MG/1
1 TABLET, FILM COATED ORAL 2 TIMES DAILY
Status: CANCELLED | OUTPATIENT
Start: 2021-03-29

## 2021-03-29 RX ORDER — METHOCARBAMOL 500 MG/1
500 TABLET, FILM COATED ORAL EVERY 6 HOURS PRN
Status: CANCELLED | OUTPATIENT
Start: 2021-03-29

## 2021-03-29 RX ORDER — MULTIVITAMIN/IRON/FOLIC ACID 18MG-0.4MG
1 TABLET ORAL DAILY
Status: DISCONTINUED | OUTPATIENT
Start: 2021-03-30 | End: 2021-04-07 | Stop reason: HOSPADM

## 2021-03-29 RX ORDER — TRAZODONE HYDROCHLORIDE 50 MG/1
50 TABLET ORAL
Status: CANCELLED | OUTPATIENT
Start: 2021-03-29

## 2021-03-29 RX ORDER — BENZTROPINE MESYLATE 1 MG/ML
1 INJECTION INTRAMUSCULAR; INTRAVENOUS
Status: DISCONTINUED | OUTPATIENT
Start: 2021-03-29 | End: 2021-04-07 | Stop reason: HOSPADM

## 2021-03-29 RX ORDER — POLYETHYLENE GLYCOL 3350 17 G/17G
17 POWDER, FOR SOLUTION ORAL DAILY PRN
Status: DISCONTINUED | OUTPATIENT
Start: 2021-03-29 | End: 2021-04-07 | Stop reason: HOSPADM

## 2021-03-29 RX ORDER — HYDROXYZINE HYDROCHLORIDE 25 MG/1
25 TABLET, FILM COATED ORAL
Status: DISCONTINUED | OUTPATIENT
Start: 2021-03-29 | End: 2021-04-07 | Stop reason: HOSPADM

## 2021-03-29 RX ORDER — HALOPERIDOL 5 MG/ML
5 INJECTION INTRAMUSCULAR
Status: DISCONTINUED | OUTPATIENT
Start: 2021-03-29 | End: 2021-04-07 | Stop reason: HOSPADM

## 2021-03-29 RX ORDER — TRAZODONE HYDROCHLORIDE 50 MG/1
50 TABLET ORAL
Status: DISCONTINUED | OUTPATIENT
Start: 2021-03-29 | End: 2021-04-07 | Stop reason: HOSPADM

## 2021-03-29 RX ORDER — LORAZEPAM 2 MG/ML
2 INJECTION INTRAMUSCULAR
Status: CANCELLED | OUTPATIENT
Start: 2021-03-29

## 2021-03-29 RX ORDER — LANOLIN ALCOHOL/MO/W.PET/CERES
100 CREAM (GRAM) TOPICAL DAILY
Status: DISCONTINUED | OUTPATIENT
Start: 2021-03-30 | End: 2021-04-07 | Stop reason: HOSPADM

## 2021-03-29 RX ORDER — MINERAL OIL AND PETROLATUM 150; 830 MG/G; MG/G
1 OINTMENT OPHTHALMIC
Status: DISCONTINUED | OUTPATIENT
Start: 2021-03-29 | End: 2021-04-07 | Stop reason: HOSPADM

## 2021-03-29 RX ORDER — POLYETHYLENE GLYCOL 3350 17 G/17G
17 POWDER, FOR SOLUTION ORAL 2 TIMES DAILY WITH MEALS
Status: DISCONTINUED | OUTPATIENT
Start: 2021-03-29 | End: 2021-03-29

## 2021-03-29 RX ORDER — ACETAMINOPHEN 325 MG/1
325 TABLET ORAL EVERY 6 HOURS PRN
Status: DISCONTINUED | OUTPATIENT
Start: 2021-03-29 | End: 2021-04-04

## 2021-03-29 RX ORDER — ACETAMINOPHEN 325 MG/1
975 TABLET ORAL 3 TIMES DAILY
Status: CANCELLED | OUTPATIENT
Start: 2021-03-29

## 2021-03-29 RX ORDER — LORAZEPAM 2 MG/ML
2 INJECTION INTRAMUSCULAR ONCE
Status: COMPLETED | OUTPATIENT
Start: 2021-03-29 | End: 2021-03-29

## 2021-03-29 RX ORDER — LORAZEPAM 1 MG/1
2 TABLET ORAL ONCE
Status: COMPLETED | OUTPATIENT
Start: 2021-03-29 | End: 2021-03-29

## 2021-03-29 RX ORDER — BENZTROPINE MESYLATE 1 MG/ML
0.5 INJECTION INTRAMUSCULAR; INTRAVENOUS
Status: DISCONTINUED | OUTPATIENT
Start: 2021-03-29 | End: 2021-04-07 | Stop reason: HOSPADM

## 2021-03-29 RX ORDER — HYDROXYZINE HYDROCHLORIDE 25 MG/1
50 TABLET, FILM COATED ORAL
Status: CANCELLED | OUTPATIENT
Start: 2021-03-29

## 2021-03-29 RX ORDER — HYDROMORPHONE HCL/PF 1 MG/ML
0.5 SYRINGE (ML) INJECTION EVERY 4 HOURS PRN
Status: DISCONTINUED | OUTPATIENT
Start: 2021-03-29 | End: 2021-03-29

## 2021-03-29 RX ORDER — BENZTROPINE MESYLATE 1 MG/ML
1 INJECTION INTRAMUSCULAR; INTRAVENOUS
Status: CANCELLED | OUTPATIENT
Start: 2021-03-29

## 2021-03-29 RX ORDER — HYDROXYZINE HYDROCHLORIDE 25 MG/1
25 TABLET, FILM COATED ORAL
Status: CANCELLED | OUTPATIENT
Start: 2021-03-29

## 2021-03-29 RX ORDER — RISPERIDONE 1 MG/1
1 TABLET, FILM COATED ORAL 2 TIMES DAILY
Status: DISCONTINUED | OUTPATIENT
Start: 2021-03-29 | End: 2021-03-30

## 2021-03-29 RX ORDER — BENZTROPINE MESYLATE 1 MG/ML
0.5 INJECTION INTRAMUSCULAR; INTRAVENOUS
Status: CANCELLED | OUTPATIENT
Start: 2021-03-29

## 2021-03-29 RX ORDER — MAGNESIUM HYDROXIDE/ALUMINUM HYDROXICE/SIMETHICONE 120; 1200; 1200 MG/30ML; MG/30ML; MG/30ML
30 SUSPENSION ORAL EVERY 4 HOURS PRN
Status: DISCONTINUED | OUTPATIENT
Start: 2021-03-29 | End: 2021-04-07 | Stop reason: HOSPADM

## 2021-03-29 RX ORDER — HALOPERIDOL 5 MG
5 TABLET ORAL
Status: DISCONTINUED | OUTPATIENT
Start: 2021-03-29 | End: 2021-04-07 | Stop reason: HOSPADM

## 2021-03-29 RX ORDER — GABAPENTIN 400 MG/1
400 CAPSULE ORAL 3 TIMES DAILY
Status: DISCONTINUED | OUTPATIENT
Start: 2021-03-29 | End: 2021-03-30

## 2021-03-29 RX ORDER — LORAZEPAM 1 MG/1
1 TABLET ORAL
Status: CANCELLED | OUTPATIENT
Start: 2021-03-29

## 2021-03-29 RX ORDER — ACETAMINOPHEN 325 MG/1
975 TABLET ORAL 3 TIMES DAILY
Status: DISCONTINUED | OUTPATIENT
Start: 2021-03-29 | End: 2021-04-04

## 2021-03-29 RX ORDER — GABAPENTIN 400 MG/1
400 CAPSULE ORAL 3 TIMES DAILY
Status: CANCELLED | OUTPATIENT
Start: 2021-03-29

## 2021-03-29 RX ORDER — LORAZEPAM 2 MG/ML
1 INJECTION INTRAMUSCULAR ONCE
Status: DISCONTINUED | OUTPATIENT
Start: 2021-03-29 | End: 2021-03-29

## 2021-03-29 RX ORDER — LORAZEPAM 2 MG/ML
1 INJECTION INTRAMUSCULAR EVERY 4 HOURS PRN
Status: DISCONTINUED | OUTPATIENT
Start: 2021-03-29 | End: 2021-03-29

## 2021-03-29 RX ORDER — HALOPERIDOL 5 MG/ML
2.5 INJECTION INTRAMUSCULAR
Status: CANCELLED | OUTPATIENT
Start: 2021-03-29

## 2021-03-29 RX ORDER — FOLIC ACID 1 MG/1
1 TABLET ORAL DAILY
Status: CANCELLED | OUTPATIENT
Start: 2021-03-30

## 2021-03-29 RX ORDER — LORAZEPAM 1 MG/1
1 TABLET ORAL
Status: DISCONTINUED | OUTPATIENT
Start: 2021-03-29 | End: 2021-04-07 | Stop reason: HOSPADM

## 2021-03-29 RX ORDER — AMOXICILLIN 250 MG
1 CAPSULE ORAL DAILY PRN
Status: CANCELLED | OUTPATIENT
Start: 2021-03-29

## 2021-03-29 RX ORDER — BENZTROPINE MESYLATE 1 MG/1
1 TABLET ORAL
Status: DISCONTINUED | OUTPATIENT
Start: 2021-03-29 | End: 2021-04-07 | Stop reason: HOSPADM

## 2021-03-29 RX ORDER — AMOXICILLIN 250 MG
1 CAPSULE ORAL DAILY PRN
Status: DISCONTINUED | OUTPATIENT
Start: 2021-03-29 | End: 2021-04-07 | Stop reason: HOSPADM

## 2021-03-29 RX ORDER — NICOTINE 21 MG/24HR
1 PATCH, TRANSDERMAL 24 HOURS TRANSDERMAL DAILY
Status: CANCELLED | OUTPATIENT
Start: 2021-03-30

## 2021-03-29 RX ORDER — FOLIC ACID 1 MG/1
1 TABLET ORAL DAILY
Status: DISCONTINUED | OUTPATIENT
Start: 2021-03-30 | End: 2021-04-07 | Stop reason: HOSPADM

## 2021-03-29 RX ORDER — LORAZEPAM 2 MG/ML
2 INJECTION INTRAMUSCULAR
Status: DISCONTINUED | OUTPATIENT
Start: 2021-03-29 | End: 2021-04-02

## 2021-03-29 RX ORDER — NICOTINE 21 MG/24HR
1 PATCH, TRANSDERMAL 24 HOURS TRANSDERMAL DAILY
Status: DISCONTINUED | OUTPATIENT
Start: 2021-03-30 | End: 2021-04-07 | Stop reason: HOSPADM

## 2021-03-29 RX ORDER — LANOLIN ALCOHOL/MO/W.PET/CERES
6 CREAM (GRAM) TOPICAL
Status: CANCELLED | OUTPATIENT
Start: 2021-03-29

## 2021-03-29 RX ORDER — DOCUSATE SODIUM 100 MG/1
100 CAPSULE, LIQUID FILLED ORAL 2 TIMES DAILY
Status: CANCELLED | OUTPATIENT
Start: 2021-03-29

## 2021-03-29 RX ORDER — LORAZEPAM 2 MG/ML
1 INJECTION INTRAMUSCULAR
Status: CANCELLED | OUTPATIENT
Start: 2021-03-29

## 2021-03-29 RX ORDER — ACETAMINOPHEN 325 MG/1
325 TABLET ORAL EVERY 6 HOURS PRN
Status: CANCELLED | OUTPATIENT
Start: 2021-03-29

## 2021-03-29 RX ORDER — HALOPERIDOL 5 MG/ML
2.5 INJECTION INTRAMUSCULAR
Status: DISCONTINUED | OUTPATIENT
Start: 2021-03-29 | End: 2021-04-07 | Stop reason: HOSPADM

## 2021-03-29 RX ORDER — METHOCARBAMOL 500 MG/1
500 TABLET, FILM COATED ORAL EVERY 6 HOURS PRN
Status: DISCONTINUED | OUTPATIENT
Start: 2021-03-29 | End: 2021-04-07 | Stop reason: HOSPADM

## 2021-03-29 RX ORDER — HALOPERIDOL 5 MG/ML
5 INJECTION INTRAMUSCULAR
Status: CANCELLED | OUTPATIENT
Start: 2021-03-29

## 2021-03-29 RX ORDER — LORAZEPAM 2 MG/ML
1 INJECTION INTRAMUSCULAR EVERY 4 HOURS PRN
Status: CANCELLED | OUTPATIENT
Start: 2021-03-29

## 2021-03-29 RX ADMIN — METHOCARBAMOL 500 MG: 500 TABLET ORAL at 18:26

## 2021-03-29 RX ADMIN — Medication 1 TABLET: at 08:53

## 2021-03-29 RX ADMIN — ACETAMINOPHEN 975 MG: 325 TABLET, FILM COATED ORAL at 08:54

## 2021-03-29 RX ADMIN — HYDROMORPHONE HYDROCHLORIDE 0.5 MG: 1 INJECTION, SOLUTION INTRAMUSCULAR; INTRAVENOUS; SUBCUTANEOUS at 02:40

## 2021-03-29 RX ADMIN — FOLIC ACID 1 MG: 1 TABLET ORAL at 08:54

## 2021-03-29 RX ADMIN — RISPERIDONE 1 MG: 1 TABLET ORAL at 08:54

## 2021-03-29 RX ADMIN — DOCUSATE SODIUM 100 MG: 100 CAPSULE, LIQUID FILLED ORAL at 08:54

## 2021-03-29 RX ADMIN — LORAZEPAM 1 MG: 1 TABLET ORAL at 17:10

## 2021-03-29 RX ADMIN — THIAMINE HCL TAB 100 MG 100 MG: 100 TAB at 08:54

## 2021-03-29 RX ADMIN — RISPERIDONE 1 MG: 1 TABLET ORAL at 17:10

## 2021-03-29 RX ADMIN — LORAZEPAM 2 MG: 2 INJECTION INTRAMUSCULAR; INTRAVENOUS at 03:34

## 2021-03-29 RX ADMIN — METHOCARBAMOL 500 MG: 500 TABLET ORAL at 14:01

## 2021-03-29 RX ADMIN — DOCUSATE SODIUM 100 MG: 100 CAPSULE ORAL at 17:10

## 2021-03-29 RX ADMIN — HALOPERIDOL 5 MG: 5 TABLET ORAL at 18:26

## 2021-03-29 RX ADMIN — ACETAMINOPHEN 325 MG: 325 TABLET, FILM COATED ORAL at 17:10

## 2021-03-29 RX ADMIN — LORAZEPAM 1 MG: 2 INJECTION INTRAMUSCULAR; INTRAVENOUS at 10:22

## 2021-03-29 RX ADMIN — NICOTINE 1 PATCH: 21 PATCH, EXTENDED RELEASE TRANSDERMAL at 08:54

## 2021-03-29 RX ADMIN — GABAPENTIN 400 MG: 400 CAPSULE ORAL at 14:01

## 2021-03-29 RX ADMIN — POLYETHYLENE GLYCOL 3350 17 G: 17 POWDER, FOR SOLUTION ORAL at 09:03

## 2021-03-29 RX ADMIN — HYDROXYZINE HYDROCHLORIDE 50 MG: 50 TABLET, FILM COATED ORAL at 18:26

## 2021-03-29 RX ADMIN — BENZTROPINE MESYLATE 1 MG: 1 TABLET ORAL at 18:25

## 2021-03-29 RX ADMIN — LORAZEPAM 2 MG: 1 TABLET ORAL at 02:40

## 2021-03-29 RX ADMIN — HYDROMORPHONE HYDROCHLORIDE 0.5 MG: 1 INJECTION, SOLUTION INTRAMUSCULAR; INTRAVENOUS; SUBCUTANEOUS at 09:25

## 2021-03-29 RX ADMIN — GABAPENTIN 400 MG: 400 CAPSULE ORAL at 08:54

## 2021-03-29 NOTE — ASSESSMENT & PLAN NOTE
· Originally presented to the emergency department due to suicidal ideation and hallucinations  · Still with command auditory hallucinations to kill himself, ongoing paranoia and anxiety  · Continue 1:1 observation as patient cannot contract for safety  · Discussed with psychiatry, continue risperdal 1 mg BID and gabapentin 400 mg TID  · Patient is medically stable for discharge back to [facility]
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
## 2021-03-29 NOTE — PLAN OF CARE
Problem: PAIN - ADULT  Goal: Verbalizes/displays adequate comfort level or baseline comfort level  Description: Interventions:  - Encourage patient to monitor pain and request assistance  - Assess pain using appropriate pain scale  - Administer analgesics based on type and severity of pain and evaluate response  - Implement non-pharmacological measures as appropriate and evaluate response  - Consider cultural and social influences on pain and pain management  - Notify physician/advanced practitioner if interventions unsuccessful or patient reports new pain  Outcome: Progressing     Problem: INFECTION - ADULT  Goal: Absence or prevention of progression during hospitalization  Description: INTERVENTIONS:  - Assess and monitor for signs and symptoms of infection  - Monitor lab/diagnostic results  - Monitor all insertion sites, i e  indwelling lines, tubes, and drains  - Monitor endotracheal if appropriate and nasal secretions for changes in amount and color  - [place] appropriate cooling/warming therapies per order  - Administer medications as ordered  - Instruct and encourage patient and family to use good hand hygiene technique  - Identify and instruct in appropriate isolation precautions for identified infection/condition  Outcome: Progressing  Goal: Absence of fever/infection during neutropenic period  Description: INTERVENTIONS:  - Monitor WBC    Outcome: Progressing     Problem: SAFETY ADULT  Goal: Patient will remain free of falls  Description: INTERVENTIONS:  - Assess patient frequently for physical needs  -  Identify cognitive and physical deficits and behaviors that affect risk of falls    -  [place] fall precautions as indicated by assessment   - Educate patient/family on patient safety including physical limitations  - Instruct patient to call for assistance with activity based on assessment  - Modify environment to reduce risk of injury  - Consider OT/PT consult to assist with strengthening/mobility  Outcome: Progressing  Goal: Maintain or return to baseline ADL function  Description: INTERVENTIONS:  -  Assess patient's ability to carry out ADLs; assess patient's baseline for ADL function and identify physical deficits which impact ability to perform ADLs (bathing, care of mouth/teeth, toileting, grooming, dressing, etc )  - Assess/evaluate cause of self-care deficits   - Assess range of motion  - Assess patient's mobility; develop plan if impaired  - Assess patient's need for assistive devices and provide as appropriate  - Encourage maximum independence but intervene and supervise when necessary  - Involve family in performance of ADLs  - Assess for home care needs following discharge   - Consider OT consult to assist with ADL evaluation and planning for discharge  - Provide patient education as appropriate  Outcome: Progressing  Goal: Maintain or return mobility status to optimal level  Description: INTERVENTIONS:  - Assess patient's baseline mobility status (ambulation, transfers, stairs, etc )    - Identify cognitive and physical deficits and behaviors that affect mobility  - Identify mobility aids required to assist with transfers and/or ambulation (gait belt, sit-to-stand, lift, walker, cane, etc )  - [place] fall precautions as indicated by assessment  - Record patient progress and toleration of activity level on Mobility SBAR; progress patient to next Phase/Stage  - Instruct patient to call for assistance with activity based on assessment  - Consider rehabilitation consult to assist with strengthening/weightbearing, etc   Outcome: Progressing     Problem: DISCHARGE PLANNING  Goal: Discharge to home or other facility with appropriate resources  Description: INTERVENTIONS:  - Identify barriers to discharge w/patient and caregiver  - Arrange for needed discharge resources and transportation as appropriate  - Identify discharge learning needs (meds, wound care, etc )  - Arrange for interpretive services to assist at discharge as needed  - Refer to Case Management Department for coordinating discharge planning if the patient needs post-hospital services based on physician/advanced practitioner order or complex needs related to functional status, cognitive ability, or social support system  Outcome: Progressing     Problem: Knowledge Deficit  Goal: Patient/family/caregiver demonstrates understanding of disease process, treatment plan, medications, and discharge instructions  Description: Complete learning assessment and assess knowledge base    Interventions:  - Provide teaching at level of understanding  - Provide teaching via preferred learning methods  Outcome: Progressing
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
## 2021-03-29 NOTE — PROGRESS NOTES
Pt becoming agitated over not being able to eat with utensils  This RN explained to patient importance of safety  for yourself along with staff due to being on a suicide  Watch  Jalen Allison made aware, orders to follow  [facility]

## 2021-03-29 NOTE — TRANSPORTATION MEDICAL NECESSITY
Section I - General Information    Name of Patient: Anastacio Friedman                 : 1985    Medicare #: [id number]  Transport Date: 21 (PCS is valid for round trips on this date and for all repetitive trips in the 60-day range as noted below )  Origin: [facility]: [address]  Is the pt's stay covered under Medicare Part A (PPS/DRG)   []     Closest appropriate facility? If no, why is transport to more distant facility required? Yes  If hospice pt, is this transport related to pt's terminal illness? No       Section II - Medical Necessity Questionnaire  Ambulance transportation is medically necessary only if other means of transport are contraindicated or would be potentially harmful to the patient  To meet this requirement, the patient must either be "bed confined" or suffer from a condition such that transport by means other than ambulance is contraindicated by the patient's condition  The following questions must be answered by the medical professional signing below for this form to be valid:    1)  Describe the MEDICAL CONDITION (physical and/or mental) of this patient AT [address] [place] [address] that requires the patient to be transported in an ambulance and why transport by other means is contraindicated by the patient's condition:  Patient is a 201 psychiatric admission    2) Is the patient "bed confined" as defined below? No  To be "be confined" the patient must satisfy all three of the following conditions: (1) unable to get up from bed without Assistance; AND (2) unable to ambulate; AND (3) unable to sit in a chair or wheelchair  3) Can this patient safely be transported by car or wheelchair van (i e , seated during transport without a medical attendant or monitoring)?    No    4) In addition to completing questions 1-3 above, please check any of the following conditions that apply*:   *Note: supporting documentation for any boxes checked must be maintained in the patient's medical records  If hosp-hosp transfer, describe services needed at 2nd facility not available at 1st facility? Danger to self/others  Medical attendant required   Other(specify) 201 psychiatric admission      Section III - Signature of Physician or Healthcare Professional  I certify that the above information is true and correct based on my evaluation of this patient, and represent that the patient requires transport by ambulance and that other forms of transport are contraindicated  I understand that this information will be used by the Centers for Medicare and Medicaid Services (CMS) to support the determination of medical necessity for ambulance services, and I represent that I have personal knowledge of the patient's condition at time of transport  []  If this box is checked, I also certify that the patient is physically or mentally incapable of signing the ambulance service's claim and that the institution with which I am affiliated has furnished care, services, or assistance to the patient  My signature below is made on behalf of the patient pursuant to 42 CFR §424 36(b)(4)  In accordance with 42 CFR §424 37, the specific reason(s) that the patient is physically or mentally incapable of signing the claim form is as follows:       Signature of Physician* or Healthcare Professional______________________________________________________________  Signature Date 03/29/21 (For scheduled repetitive transports, this form is not valid for transports performed more than 60 days after this date)    Printed Name & Credentials of Physician or Healthcare Professional (MD, DO, RN, etc )Carolyn Marshall RN  *Form must be signed by patient's attending physician for scheduled, repetitive transports   For non-repetitive, unscheduled ambulance transports, if unable to obtain the signature of the attending physician, any of the following may sign (choose appropriate option below)  [] Physician Assistant []  Clinical Nurse Specialist [x]  Registered Nurse  []  Nurse Practitioner  [x] Discharge Planner

## 2021-03-29 NOTE — PLAN OF CARE
Problem: PAIN - ADULT  Goal: Verbalizes/displays adequate comfort level or baseline comfort level  Description: Interventions:  - Encourage patient to monitor pain and request assistance  - Assess pain using appropriate pain scale  - Administer analgesics based on type and severity of pain and evaluate response  - Implement non-pharmacological measures as appropriate and evaluate response  - Consider cultural and social influences on pain and pain management  - Notify physician/advanced practitioner if interventions unsuccessful or patient reports new pain  3/29/2021 0730 by Vincenzo Sequeira RN  Outcome: Progressing  3/29/2021 0730 by Vincenzo Sequeira RN  Outcome: Progressing     Problem: INFECTION - ADULT  Goal: Absence or prevention of progression during hospitalization  Description: INTERVENTIONS:  - Assess and monitor for signs and symptoms of infection  - Monitor lab/diagnostic results  - Monitor all insertion sites, i e  indwelling lines, tubes, and drains  - Monitor endotracheal if appropriate and nasal secretions for changes in amount and color  - Pillow appropriate cooling/warming therapies per order  - Administer medications as ordered  - Instruct and encourage patient and family to use good hand hygiene technique  - Identify and instruct in appropriate isolation precautions for identified infection/condition  3/29/2021 0730 by Vincenzo Sequeira RN  Outcome: Progressing  3/29/2021 0730 by Vincenzo Sequeira RN  Outcome: Progressing  Goal: Absence of fever/infection during neutropenic period  Description: INTERVENTIONS:  - Monitor WBC    3/29/2021 0730 by Vincenzo Sequeira RN  Outcome: Progressing  3/29/2021 0730 by Vincenzo Sequeira RN  Outcome: Progressing     Problem: SAFETY ADULT  Goal: Patient will remain free of falls  Description: INTERVENTIONS:  - Assess patient frequently for physical needs  -  Identify cognitive and physical deficits and behaviors that affect risk of falls    -  [place]illow fall precautions as indicated by assessment   - Educate patient/family on patient safety including physical limitations  - Instruct patient to call for assistance with activity based on assessment  - Modify environment to reduce risk of injury  - Consider OT/PT consult to assist with strengthening/mobility  3/29/2021 0730 by Leandra Munoz RN  Outcome: Progressing  3/29/2021 0730 by Leandra Munoz RN  Outcome: Progressing  Goal: Maintain or return to baseline ADL function  Description: INTERVENTIONS:  -  Assess patient's ability to carry out ADLs; assess patient's baseline for ADL function and identify physical deficits which impact ability to perform ADLs (bathing, care of mouth/teeth, toileting, grooming, dressing, etc )  - Assess/evaluate cause of self-care deficits   - Assess range of motion  - Assess patient's mobility; develop plan if impaired  - Assess patient's need for assistive devices and provide as appropriate  - Encourage maximum independence but intervene and supervise when necessary  - Involve family in performance of ADLs  - Assess for home care needs following discharge   - Consider OT consult to assist with ADL evaluation and planning for discharge  - Provide patient education as appropriate  3/29/2021 0730 by Leandra Munoz RN  Outcome: Progressing  3/29/2021 0730 by Leandra Munoz RN  Outcome: Progressing  Goal: Maintain or return mobility status to optimal level  Description: INTERVENTIONS:  - Assess patient's baseline mobility status (ambulation, transfers, stairs, etc )    - Identify cognitive and physical deficits and behaviors that affect mobility  - Identify mobility aids required to assist with transfers and/or ambulation (gait belt, sit-to-stand, lift, walker, cane, etc )  - Perry fall precautions as indicated by assessment  - Record patient progress and toleration of activity level on Mobility SBAR; progress patient to next Phase/Stage  - Instruct patient to call for assistance with activity based on assessment  - Consider rehabilitation consult to assist with strengthening/weightbearing, etc   3/29/2021 0730 by Zaria Regalado RN  Outcome: Progressing  3/29/2021 0730 by Zaria Regalado RN  Outcome: Progressing     Problem: DISCHARGE PLANNING  Goal: Discharge to home or other facility with appropriate resources  Description: INTERVENTIONS:  - Identify barriers to discharge w/patient and caregiver  - Arrange for needed discharge resources and transportation as appropriate  - Identify discharge learning needs (meds, wound care, etc )  - Arrange for interpretive services to assist at discharge as needed  - Refer to Case Management Department for coordinating discharge planning if the patient needs post-hospital services based on physician/advanced practitioner order or complex needs related to functional status, cognitive ability, or social support system  3/29/2021 0730 by Zaria Regalado RN  Outcome: Progressing  3/29/2021 0730 by Zaria Regalado RN  Outcome: Progressing     Problem: Knowledge Deficit  Goal: Patient/family/caregiver demonstrates understanding of disease process, treatment plan, medications, and discharge instructions  Description: Complete learning assessment and assess knowledge base    Interventions:  - Provide teaching at level of understanding  - Provide teaching via preferred learning methods  3/29/2021 0730 by Zaria Regalado RN  Outcome: Progressing  3/29/2021 0730 by Zaria Regalado RN  Outcome: Progressing

## 2021-03-29 NOTE — ASSESSMENT & PLAN NOTE
· Originally presented to the emergency department due to suicidal ideation and hallucinations  · Still with command auditory hallucinations to kill himself, ongoing paranoia and anxiety  · Continue 1:1 observation as patient cannot contract for safety  · Discussed with psychiatry, continue risperdal 1 mg BID and gabapentin 400 mg TID  · Patient is medically stable for discharge back to [facility]
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
## 2021-03-29 NOTE — PLAN OF CARE
Problem: PAIN - ADULT  Goal: Verbalizes/displays adequate comfort level or baseline comfort level  Description: Interventions:  - Encourage patient to monitor pain and request assistance  - Assess pain using appropriate pain scale  - Administer analgesics based on type and severity of pain and evaluate response  - Implement non-pharmacological measures as appropriate and evaluate response  - Consider cultural and social influences on pain and pain management  - Notify physician/advanced practitioner if interventions unsuccessful or patient reports new pain  Outcome: Progressing     Problem: INFECTION - ADULT  Goal: Absence or prevention of progression during hospitalization  Description: INTERVENTIONS:  - Assess and monitor for signs and symptoms of infection  - Monitor lab/diagnostic results  - Monitor all insertion sites, i e  indwelling lines, tubes, and drains  - Monitor endotracheal if appropriate and nasal secretions for changes in amount and color  - [place] appropriate cooling/warming therapies per order  - Administer medications as ordered  - Instruct and encourage patient and family to use good hand hygiene technique  - Identify and instruct in appropriate isolation precautions for identified infection/condition  Outcome: Progressing  Goal: Absence of fever/infection during neutropenic period  Description: INTERVENTIONS:  - Monitor WBC    Outcome: Progressing     Problem: SAFETY ADULT  Goal: Patient will remain free of falls  Description: INTERVENTIONS:  - Assess patient frequently for physical needs  -  Identify cognitive and physical deficits and behaviors that affect risk of falls    -  [place]ell fall precautions as indicated by assessment   - Educate patient/family on patient safety including physical limitations  - Instruct patient to call for assistance with activity based on assessment  - Modify environment to reduce risk of injury  - Consider OT/PT consult to assist with strengthening/mobility  Outcome: Progressing  Goal: Maintain or return to baseline ADL function  Description: INTERVENTIONS:  -  Assess patient's ability to carry out ADLs; assess patient's baseline for ADL function and identify physical deficits which impact ability to perform ADLs (bathing, care of mouth/teeth, toileting, grooming, dressing, etc )  - Assess/evaluate cause of self-care deficits   - Assess range of motion  - Assess patient's mobility; develop plan if impaired  - Assess patient's need for assistive devices and provide as appropriate  - Encourage maximum independence but intervene and supervise when necessary  - Involve family in performance of ADLs  - Assess for home care needs following discharge   - Consider OT consult to assist with ADL evaluation and planning for discharge  - Provide patient education as appropriate  Outcome: Progressing  Goal: Maintain or return mobility status to optimal level  Description: INTERVENTIONS:  - Assess patient's baseline mobility status (ambulation, transfers, stairs, etc )    - Identify cognitive and physical deficits and behaviors that affect mobility  - Identify mobility aids required to assist with transfers and/or ambulation (gait belt, sit-to-stand, lift, walker, cane, etc )  - Calcium fall precautions as indicated by assessment  - Record patient progress and toleration of activity level on Mobility SBAR; progress patient to next Phase/Stage  - Instruct patient to call for assistance with activity based on assessment  - Consider rehabilitation consult to assist with strengthening/weightbearing, etc   Outcome: Progressing     Problem: DISCHARGE PLANNING  Goal: Discharge to home or other facility with appropriate resources  Description: INTERVENTIONS:  - Identify barriers to discharge w/patient and caregiver  - Arrange for needed discharge resources and transportation as appropriate  - Identify discharge learning needs (meds, wound care, etc )  - Arrange for interpretive services to assist at discharge as needed  - Refer to Case Management Department for coordinating discharge planning if the patient needs post-hospital services based on physician/advanced practitioner order or complex needs related to functional status, cognitive ability, or social support system  Outcome: Progressing     Problem: Knowledge Deficit  Goal: Patient/family/caregiver demonstrates understanding of disease process, treatment plan, medications, and discharge instructions  Description: Complete learning assessment and assess knowledge base    Interventions:  - Provide teaching at level of understanding  - Provide teaching via preferred learning methods  Outcome: Progressing
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
## 2021-03-29 NOTE — CASE MANAGEMENT
OBS  LOS1    Met with patient briefly to discuss DCP  Pt has been accepted back to [facility] behavioral health unit today  Await a call back with time to arrange transport  Pt reports he is homeless and will not go to a shelter upon [address] discharge  He is independent PTA  His grandmother and SO are emergency contacts however he does not want this CM to contact them regarding d/c today  Await a call back from ARMANDO at [facility] intake with a time to set up transport

## 2021-03-30 PROCEDURE — 99222 1ST HOSP IP/OBS MODERATE 55: CPT | Performed by: STUDENT IN AN ORGANIZED HEALTH CARE EDUCATION/TRAINING PROGRAM

## 2021-03-30 PROCEDURE — 99252 IP/OBS CONSLTJ NEW/EST SF 35: CPT | Performed by: INTERNAL MEDICINE

## 2021-03-30 RX ORDER — GABAPENTIN 300 MG/1
600 CAPSULE ORAL ONCE
Status: COMPLETED | OUTPATIENT
Start: 2021-03-30 | End: 2021-03-30

## 2021-03-30 RX ORDER — MIRTAZAPINE 15 MG/1
15 TABLET, FILM COATED ORAL
Status: DISCONTINUED | OUTPATIENT
Start: 2021-03-30 | End: 2021-04-07 | Stop reason: HOSPADM

## 2021-03-30 RX ORDER — DIVALPROEX SODIUM 500 MG/1
500 TABLET, EXTENDED RELEASE ORAL
Status: DISCONTINUED | OUTPATIENT
Start: 2021-03-30 | End: 2021-04-04

## 2021-03-30 RX ORDER — PALIPERIDONE 3 MG/1
6 TABLET, EXTENDED RELEASE ORAL DAILY
Status: DISCONTINUED | OUTPATIENT
Start: 2021-03-30 | End: 2021-04-01

## 2021-03-30 RX ORDER — GABAPENTIN 300 MG/1
600 CAPSULE ORAL 3 TIMES DAILY
Status: DISCONTINUED | OUTPATIENT
Start: 2021-03-30 | End: 2021-04-07 | Stop reason: HOSPADM

## 2021-03-30 RX ADMIN — LORAZEPAM 2 MG: 2 INJECTION INTRAMUSCULAR; INTRAVENOUS at 17:37

## 2021-03-30 RX ADMIN — HYDROXYZINE HYDROCHLORIDE 50 MG: 50 TABLET, FILM COATED ORAL at 14:30

## 2021-03-30 RX ADMIN — HALOPERIDOL LACTATE 5 MG: 5 INJECTION, SOLUTION INTRAMUSCULAR at 17:37

## 2021-03-30 RX ADMIN — GABAPENTIN 600 MG: 300 CAPSULE ORAL at 14:29

## 2021-03-30 RX ADMIN — MIRTAZAPINE 15 MG: 15 TABLET, FILM COATED ORAL at 21:57

## 2021-03-30 RX ADMIN — DOCUSATE SODIUM 100 MG: 100 CAPSULE ORAL at 10:08

## 2021-03-30 RX ADMIN — BENZTROPINE MESYLATE 1 MG: 1 INJECTION INTRAMUSCULAR; INTRAVENOUS at 17:37

## 2021-03-30 RX ADMIN — MELATONIN TAB 3 MG 6 MG: 3 TAB at 21:57

## 2021-03-30 RX ADMIN — ACETAMINOPHEN 975 MG: 325 TABLET, FILM COATED ORAL at 02:26

## 2021-03-30 RX ADMIN — GABAPENTIN 600 MG: 300 CAPSULE ORAL at 10:37

## 2021-03-30 RX ADMIN — THIAMINE HCL TAB 100 MG 100 MG: 100 TAB at 10:08

## 2021-03-30 RX ADMIN — DIVALPROEX SODIUM 500 MG: 500 TABLET, EXTENDED RELEASE ORAL at 21:57

## 2021-03-30 RX ADMIN — NICOTINE 1 PATCH: 21 PATCH, EXTENDED RELEASE TRANSDERMAL at 10:10

## 2021-03-30 RX ADMIN — FOLIC ACID 1 MG: 1 TABLET ORAL at 10:08

## 2021-03-30 RX ADMIN — ACETAMINOPHEN 975 MG: 325 TABLET, FILM COATED ORAL at 10:07

## 2021-03-30 RX ADMIN — GABAPENTIN 400 MG: 400 CAPSULE ORAL at 02:26

## 2021-03-30 RX ADMIN — ACETAMINOPHEN 975 MG: 325 TABLET, FILM COATED ORAL at 21:56

## 2021-03-30 RX ADMIN — GABAPENTIN 600 MG: 300 CAPSULE ORAL at 21:57

## 2021-03-30 RX ADMIN — ACETAMINOPHEN 975 MG: 325 TABLET, FILM COATED ORAL at 16:18

## 2021-03-30 RX ADMIN — Medication 1 TABLET: at 10:08

## 2021-03-30 RX ADMIN — PALIPERIDONE 6 MG: 3 TABLET, EXTENDED RELEASE ORAL at 10:51

## 2021-03-30 RX ADMIN — MELATONIN TAB 3 MG 6 MG: 3 TAB at 02:25

## 2021-03-30 RX ADMIN — DOCUSATE SODIUM 100 MG: 100 CAPSULE ORAL at 17:44

## 2021-03-30 NOTE — DISCHARGE INSTR - OTHER ORDERS
You have been accepted to [facility] for further treatment at [address] [place], 17[address] [facility]  Phone: [phone]    Senior Admissions Specialist:   Griffin Buchanan (Cell: [phone])         Outpatient Therapy and Psychiatrist Providers   (If you wish to seek Outpatient Therapy and Medication Management please contact the following providers to see if they can schedule you for an intake )       Preventive Measures   [facility] 3 [facility], [facility] Joanna Kerr 1460  [phone]      [place] [address]  Psychiatry  [facility] 50   [place], [place] [address] 8 miles   [phone]      Concern Prof Services for [facility]  Psychiatry, Telehealth Services Available  90 621 [facility] [address] [address] [id number] 1 miles   [phone]      [place] [facility]  Counseling, Professional Counseling  712 [facility] Terry Daly 78, [address]  [phone]      R [address] 21, Professional Counseling, [address] Available  6350 [facility] 1610 [facility]  [facility], [address] [place] [address]   [phone]        Couples Counseling   (Contact these agencies if you wish to seek couples counseling)     CATHY Alejandra, MS  Marriage & Family Therapy  Map marker 1  [address] [id number] 8 miles Get directions  More locations   [phone]      VERONICA Us, ROSINAT  Marriage & Family Therapy,   22 [address] [place]  [facility], [address]   [phone], [address], LMFT, MFT  Marriage & Family Therapy,   1100 [facility] 4 [facility], [address] [place] [address]   [phone]      CATHY Eugene  Marriage & Family Therapy,   [address]  2827 [facility], 120 [facility]   [phone]        What you need to know aboutcoronavirus disease 2019 (COVID-19)     What is coronavirus disease 2019 (COVID-19)?    Coronavirus disease 2019 (COVID-19) is a respiratory illness that can spread from person to person  The virus that causes COVID-19 is a novel coronavirus that was first identified during an investigation into an outbreak in [place], [place]  Can people in the [place]  get COVID-19? Yes  COVID-19 is spreading from person to person in parts of the [place]  Risk of infection with COVID-19 is higher for people who are close contacts of someone known to have COVID-19, for example healthcare workers, or household members  Other people at higher risk for infection are those who live in or have recently been in an area with ongoing spread of COVID-19  Learn more about places with ongoing spread at   [facility]  html#geographic  Have there been cases of COVID-19 in the [place] ?   Yes  The first case of COVID-19 in the [place] was reported on January 21, 2020  The current count of cases of COVID-19 in the [place] is available on [employer] at [facility]  How does COVID-19 spread? The virus that causes COVID-19 probably emerged from an animal source, but is now spreading from person to person  The virus is thought to spread mainly between people who are in close contact with one another (within about 6 feet) through respiratory droplets produced when an infected person coughs or sneezes  It also may be possible that a person can get COVID-19 by touching a surface or object that has the virus on it and then touching their own mouth, nose, or possibly their eyes, but this is not thought to be the main way the virus spreads  Learn what is known about the spread of newly emerged coronaviruses at [facility]  What are the symptoms of COVID-19?   Patients with COVID-19 have had mild to severe respiratory illness with symptoms of   fever   cough   shortness of breath  What are severe complications from this virus? Some patients have pneumonia in both lungs, multi-organ failure and in some cases death  How can I help protect myself? People can help protect themselves from respiratory illness with everyday preventive actions  Avoid close contact with people who are sick  Avoid touching your eyes, nose, and mouth withunwashed hands  Wash your hands often with soap and water for at least 20 seconds  Use an alcohol-based hand  that contains at least 60% alcohol if soap and water are not available  If you are sick, to keep from spreading respiratory illness to others, you should   Stay home when you are sick  Cover your cough or sneeze with a tissue, then throw the tissue in the trash  Clean and disinfect frequently touched objectsand surfaces  What should I do if I recently traveled from an area with ongoing spread of COVID-19? If you have traveled from an affected area, there may be restrictions on your movements for up to 2 weeks  If you develop symptoms during that period (fever, cough, trouble breathing), seek medical advice  Call the office of your health care provider before you go, and tell them about your travel and your symptoms  They will give you instructions on how to get care without exposing other people to your illness  While sick, avoid contact with people, don't go out and delay any travel to reduce the possibility of spreading illness to others  Is there a treatment? There is no specific antiviral treatment for COVID-19  People with COVID-19 can seek medical care to helprelieve symptoms  For more information: www cdc gov/EWTPC41YS 180058-G 03/03/2020       What to do if you are sick withcoronavirus disease 2019 (COVID-19)     If you are sick with COVID-19 or suspect you are infected with the virus that causes COVID-19, follow the steps below to help prevent the disease from spreading to people in your home and community     Stay home except to get medical care   You should restrict activities outside your home, except for getting medical care  Do not go to work, school, or public areas  Avoid using public transportation, ride-sharing, or taxis  Separate yourself from other people and animals inyour home  People: As much as possible, you should stay in a specific room and away from other people in your home  Also, you should use a separate bathroom, if available  Animals: Do not handle pets or other animals while sick  See COVID-19 and Animals for more information  Call ahead before visiting your doctor   If you have a medical appointment, call the healthcare provider and tell them that you have or may have COVID-19  This will help the healthcare provider's office take steps to keep other people from getting infected or exposed  Wear a facemask  You should wear a facemask when you are around other people (e g , sharing a room or vehicle) or pets and before you enter a healthcare provider's office  If you are not able to wear a facemask (for example, because it causes trouble breathing), then people who live with you should not stay in the same room with you, or they should wear a facemask if they enteryour room  Cover your coughs and sneezes   Cover your mouth and nose with a tissue when you cough or sneeze  Throw used tissues in a lined trash can; immediately wash your hands with soap and water for at least 20 seconds or clean your hands with an alcohol-based hand  that contains at least 60 to 95% alcohol, covering all surfaces of your hands and rubbing them together until they feel dry  Soap and water should be used preferentially if hands are visibly dirty  Avoid sharing personal household items   You should not share dishes, drinking glasses, cups, eating utensils, towels, or bedding with other people or pets in your home  After using these items, they should be washed thoroughly with soap and water     Clean your hands often  Wash your hands often with soap and water for at least 20 seconds  If soap and water are not available, clean your hands with an alcohol-based hand  that contains at least 60% alcohol, covering all surfaces of your hands and rubbing them together until they feel dry  Soap and water should be used preferentially if hands are visibly dirty  Avoid touching your eyes, nose, and mouth with unwashed hands  Clean all "high-touch" surfaces every day  High touch surfaces include counters, tabletops, doorknobs, bathroom fixtures, toilets, phones, keyboards, tablets, and bedside tables  Also, clean any surfaces that may have blood, stool, or body fluids on them  Use a household cleaning spray or wipe, according to the label instructions  Labels contain instructions for safe and effective use of the cleaning product including precautions you should take when applying the product, such as wearing gloves and making sure you have good ventilation during use of the product  Monitor your symptoms  Seek prompt medical attention if your illness is worsening (e g , difficulty breathing)  Before seeking care, call your healthcare provider and tell them that you have, or are being evaluated for, COVID-19  Put on a facemask before you enter the facility  These steps will help the healthcare provider's office to keep other people in the office or waiting room from getting infectedor exposed  Ask your healthcare provider to call the local or state health department  Persons who are placed under active monitoring or facilitated self-monitoring should follow instructions provided by their local health department or occupational health professionals, as appropriate  If you have a medical emergency and need to call 911, notify the dispatch personnel that you have, or are being evaluated for COVID-19  If possible, put on a facemask before emergency medical services arrive    Discontinuing home isolation  Patients with confirmed COVID-19 should remain under home isolation precautions until the risk of secondary transmission to others is thought to be low  The decision to discontinue home isolation precautions should be made on a case-by-case basis, in consultation with healthcare providers and state and local health departments  For more information: www cdc gov/GIMUN85BX 301543-I 02/24/2020       Stay home when you are sick,except to get medical care  Wash your hands often with soap and water for at least 20 seconds  Cover your cough or sneeze with a tissue, then throw the tissue in the trash  Clean and disinfect frequently touched objects and surfaces  Avoid touching your eyes, nose, and mouth  STOP THE SPREAD OF GERMS  For more information: www cdc gov/COVID19 Avoid close contact with people who are sick  Help prevent the spread of respiratory diseases like COVID-19  Team [place]   COVID-19 CRISIS COUNSELING PROGRAM   GET CONNECTED WITH A FREE CRISIS COUNSELOR   CALL [phone]   Do you feel    Stressed? Overwhelmed? Alone? Afraid? Anxiety? During these uncertain times, you are not alone  We are here to listen  Please call our [facility] and Referral Line   [phone] TTY: [phone]   There are trained professionals available 24/7 ready to help you navigate these unprecedented challenges  These services are FREE & CONFIDENTIAL  This publication was made possible by Chelle Worthington Number [id number], in collaboration with the [address] [place] [address]  Drug and Alcohol Resources in [facility]    If you have health insurance, including medical assistance, there should be a phone number on your insurance card that you can call to find out how to access services  The card may say, For [facility] or For Drug and Alcohol Services or For Substance Abuse Services call the number provided   Or contact [phone]    The Center of Excellence for Opioid Use Disorder (JOHN)  The [facility] provides care management for adults with Opioid Use Disorder  The [facility] has a team of care managers who will help individuals get into treatment, coordinate behavioral and physical health care, and provide recovery support and guidance  Care managers will also provide information about Medication-Assisted Treatment  Contact [phone]    [facility] Drug and Alcohol Administration [phone] For additional information, contact the Department of Human Services Information and Referral Unit at [phone] between the hours of 8:30 a m  and 4:30 p m , Monday through Friday  Recovery Centers  These centers offer a safe, sober environment to those in recovery  A variety of programming including 12-Step Meetings, Claudia Cyprosalio, Life Skills Workshops, etc  is offered at each location  [address] [phone]   [place] [address] [phone] www  [facility]  [address] [facility]  Confidential free help, from public health agencies, to find substance use treatment and information  [phone]    Link for Zoom Codes for Virtual 12 step Meetings [place] [address]  aspx    [facility]  If you feel you have a problem with alcohol, or if you simply want to know more about AA, call our 24-hour hotline at: [phone]     Emeterio 77 Meetings can be found at http://www moncada-wood biz/  MIRIAM Meeting list https://www rocioTasted Menu/  pdf          70 5[address] is a confidential 7 days/week telephone support service manned by trained mental health consumers  Warmline operates daily but is not able to accept calls between 2AM-6AM  Warmline provides support, a listening ear and can provide information about available services   Warmline specializes in the concerns of mental health consumers, their families and friends  However, we are also here for anyone who has a mental health concern, is confused about or just doesn't know anything about mental health or where to get information  To reach Davida, call [phone] accepts calls between 6:00 AM to 10:00 AM and from 4:00 PM to 12:00 AM     Text CONNECT to 515629 from anywhere in the [place], anytime, about any type of crisis  A live, trained Crisis Counselor receives the text and lets you know that they are here to listen  The volunteer Crisis Counselor will help you move from a hot moment to a cool moment  [facility] ([facility]) AT [place] Intervention   licensed telephone and mobile crisis services that provide mental health assessments to all age groups regardless of income or insurance  Crisis Intervention operates 24-hour/7 days a week  [address] [place] [address] assists consumer who are experiencing a mental health emergency and lack the resources to assist themselves  Immediate intervention for suicidal and depressed individuals with home visits/outreach being top priority  Crisis can be contacted at [phone]  [facility]) offers various education & support groups for you & your family  For more information visit their website at http://PoshVine/     Dial 2-1-1 to get connected/get help  Free, confidential information & referral available 24/7: Aging Services, Child & Youth Services, Counseling, Education/Training, Food/Shelter/Clothing, Health Services, Parenting, Substance Abuse, Support Groups, Volunteer Opportunities, & much more  Phone: 2-1-1 or [phone], Web: CHICHI DU178NBGD OYCUONG, Email: [email or web address]    The [facility] (Rudolph Boyd 12, [place], 98 [facility], [place])) offers persons with a mental illness a safe, healing environment to explore their personal and vocational potential and receive support in achieving their goals   Instead of traditional therapy, the work of the house is the rehabilitation  As members contribute meaningful work to the house, they build confidence and a sense of purpose  Be a Member - It's Free Membership in the [facility] is open to any [place] resident who is 25 or older and has a history of mental illness  Membership is free for [place] [address], [address] Hours: Monday - Friday: 8 a m  - 4 p m  With varying hours on holidays [phone]    The [address] ([address]) provides a stress-free atmosphere for persons 18 and older who have experienced mental health issues  What [facility] [address],  Activities, Games, [employer], [employer] gatherings, Recovery, Employment, Education, [employer], Empowerment, Helps participants achieve their goals, Enhances quality of life, Encourages leadership  [address] Hours: Monday through Friday: 4 p m  - 9 p m  Saturday: 2 p m  - 8 p m  Closed Sundays Varying hours on holidays  Contact [phone]    The National Suicide Prevention Lifeline, 7-621-922-CGGB (4247), is a federally funded, 24-hour, toll-free suicide prevention service comprised of more than 120 individual crisis centers across the country  This service is available to anyone in suicidal crisis, emotional distress or those concerned about a friend or loved one  Https://suicidepreventionlifeline org/          STREET MEDICINE RESOURCE GUIDE     Joe Bob is the main contact from [address] and her direct number is [phone], her email contact is [email or web address]  MEDICAL CLINIC OPERATIONS:    Our [facility]  [address]  Medical Clinic Operations: 4th Monday of the month  12:30-1:30 PM     [address]   [address]  Enter on the left of the Mandaen building off [facility] [address]  Medical Clinic Operations : Every Wednesday of the month 11:00am-2:00 pm     Angelic Latoya [facility] 212 [facility], [address] [facility]  Medical Clinic Operations : 1st and 3rd Monday of the month from [address] [facility], [address]  Medical Clinic Operations 2nd and 4th Tuesday of the month from [address] [facility]  Antoinette 70 [address] Operations 2nd and 4th Friday of the month from 11:00am-2:00pm  AND every Tuesday 6-8 pm   [place] MEDICINE RESOURCE GUIDE    MEDICAL CLINIC OPERATIONS:    [facility]  100 [facility], [address] [place] [address]  Medical Clinic Operations: 4th Monday of the month  12:30-1:30 PM     [address]  1035 [facility], [address] [place] [address]  Enter on the left of the [facility] building off [place] [address] Operations : Every Wednesday of the month 11:00am-2:00 pm     [address], 210 [facility]  Medical Clinic Operations : 1st and 3rd Monday of the month from [address] [facility], [address]  Medical Clinic Operations 2nd and 4th Tuesday of the month from 5:36am-2:18pm     [address] Arianne Seis 1266 10[address] Operations 2nd and 4th Friday of the month from 11:00am-2:00pm  AND every Tuesday 6-8 pm         Tessa Waller 63: [address]:   1  Obtain a housing voucher at the [employer] station at [facility]  2  Photo identification will be required  3  Come to the [address] [place] [address] O between 3:30 P  M  and 7:30 P M  (Dinner is served at 5:30 P M )  Para poder ser [employer] en esta MISSION tienes que hacer lo siguiente:  1   Tienes que obtener un boleto (voucher) de en la estacion de policia coronado 10 [address]  2  Shirley Maria Dolores con retrato es requerida  3  La entrada a la [place] es de 3:30 P M  Y 7:30 P M  (La comida sera servida a las 5:30 P M )  The warming station at the [employer]  Open November 1st to April 30th (assuming no additional covid issues)    7pm to 7am    Masks required to enter and at all times when not eating or sleeping    [place] [facility] [place]    For 3812-1700 season location moved to [employer]  ________________________________________________________________    [facility] for [facility] season:  -Shelter open from November 15th-April 15th  -Doors open 5-10pm  Once checked in you are no longer able to leave for the night   -Must go to Aris BISHOP to get voucher prior to going to shelter    -Temperature checks/symptom checklist completed upon arrival   -Meals done in shifts to keep people physically distanced    -No social room/cafeteria access this year  -Masks worn at all times except when sleeping   -One service provider allowed per night/given a room to meet individually with residents  -If someone shows signs of Covid they are to call a hotline number connected to an RN at either [facility] or [facility] who will instruct residents whether they need to go to the hospital or not  -If sent to the hospital, residents will stay until they have a negative result and will need proof of negative test prior to re-entrance to shelter    -No in house sick room at shelter  All residents will quarantine at hospital for entire 2 week period or until test is negative    -No change in number of people shelter can accept due to splitting residents into a different part of the [facility] than usual  So shelter will have usual resident numbers    -No volunteers allowed  Shelter will be run by 2 staff and the shelter manager all season

## 2021-03-30 NOTE — TREATMENT PLAN
TREATMENT PLAN REVIEW - [facility] [place] 31 [place]  1985 [place] MRN: [id number]    [address] [place] [address] Room / Bed: [place] [address]/[facility] [id number] Encounter: [id number]          Admit Date/Time:  3/29/2021  4:42 PM    Treatment Team: Attending Provider: Patsy Jackson MD; Patient Care Technician: Gio Chang; Patient Care Technician: Shanda Hollingsworth; Patient Care Technician: Debbie Heaton; Patient Care Assistant: Linda Vargas; Occupational Therapy Assistant: ROXANA Day; Patient Care Technician: Capri Covarrubias; Care Manager: Wilfrid Mckeon RN; Charge Nurse: Caroline Rojas RN; Registered Nurse: Doroteo Corona LPN; Registered Nurse: Ricky Fisher RN; Licensed Practical Nurse: Sonny Love LPN; : Maritza Rudolph; Security: HarPalmer Hargreaves;  Patient Care Assistant: Stephen East    Diagnosis: Principal Problem:    Schizoaffective disorder, bipolar type ([facility] )  Active Problems:    Medical clearance for psychiatric admission    Right-sided chest wall pain    Tobacco abuse    Hallucinations    Emphysematous bleb of lung ([facility] )    Constipation      Patient Strengths/Assets: good past treatment response, motivation for treatment/growth, patient is on a voluntary commitment    Patient Barriers/Limitations: marital/family conflict, substance abuse    Short Term Goals: decrease in depressive symptoms, decrease in anxiety symptoms, decrease in paranoid thoughts, decrease in suicidal thoughts, decrease in self abusive behaviors, improvement in insight, sleep improvement    Long Term Goals: improvement in depression, improvement in anxiety, resolution of depressive symptoms, stabilization of mood, free of suicidal thoughts, no self abusive behavior, improved insight    Progress Towards Goals: starting psychiatric medications as prescribed    Recommended Treatment: medication management, patient medication education, group therapy, milieu therapy, continued Behavioral Health psychiatric evaluation/assessment process    Treatment Frequency: daily medication monitoring, group and milieu therapy daily, monitoring through interdisciplinary rounds, monitoring through weekly patient care conferences    Expected Discharge Date:  5-7 days    Discharge Plan: referral for outpatient medication management with a psychiatrist, referral for outpatient psychotherapy    Treatment Plan Created/Updated By: Siri Hawthorne MD

## 2021-03-30 NOTE — NURSING NOTE
Pt given PRN Haldol 5mg, Ativan 2mg and Cogentin IM @ 9962 for severe agitation  Broset 3  On follow up, pt sleeping comfortably in bed

## 2021-03-30 NOTE — CONSULTS
[address] 1985, 31 y o  male MRN: [id number]  Unit/Bed#: [facility] 251-02 Encounter: [id number]  Primary Care Provider: No primary care provider on file  Date and time admitted to hospital: 3/29/2021  4:42 PM    Inpatient consult for Medical Clearance for [address] patient  Consult performed by: Gerri Argueta MD  Consult ordered by: Zeeshan Gibbons PA-C          Emphysematous bleb of lung [facility])  Assessment & Plan  Abnormal CXR/CT scan chest findings of biapical lung bullae measuring 7 3 x 7 8cm on the right  Seen and cleared by pulmonary  Encouraged to quit smoking  To follow up with thoracic surgery eval for possible bled resection in the future  To follow up with pulmonary on discharge    Hallucinations  Assessment & Plan  Plan highlighted above    Tobacco abuse  Assessment & Plan  Smoking cessation counseling  Nicotine patch    Right-sided chest wall pain  Assessment & Plan  Right sided chest wall pain - improved  Tylenol prn    Medical clearance for psychiatric admission  Assessment & Plan  Patient admitted to psych for management of hallucinations/schizoaffective disorder  Noted to have upper lobe blebs but hemodynamically stable  No active medical interventions or issues  Can continue with psych management    * Schizoaffective disorder, bipolar type ([facility] )  Assessment & Plan  Currently on haldol prn, hydroxyzine prn, Trazodone prn, ativan prn, management per [facility] team      VTE Prophylaxis: Reason for no pharmacologic prophylaxis low VTE screen score  / reason for no mechanical VTE prophylaxis low VTE screen score     Recommendations for Discharge:  · Follow up with PCP and pulmonary attending on discharge    Counseling / Coordination of Care Time: 45 minutes  Greater than 50% of total time spent on patient counseling and coordination of care  Collaboration of Care:  Were Recommendations Directly Discussed with Primary Treatment Team? - Yes History of Present Illness:    Juan Daniel Olson is a 31 y o  male who is originally admitted to the psychiatry service due to hallucinations, schizoaffective disorder  We are consulted for medical clearance  He was initially admitted to the [facility] service on 3/27/21 bu had complained of right sided chest pain and a routine CXR showed upper lobe blebs,worse on the right thus he was transferred to [facility] for CT chest and pulm eval  He is at high risk of spontaneous pneumothorax and pulmonary discussed risk stratification with him including smoking cessation  He currently feels fine and has no new complaints  He understands the high risk of spontaneous pneumo and death but says 'I can't quit smoking, i've been smoking since I was 18years old'  Review of Systems:    Review of Systems   Constitutional: Negative for chills, diaphoresis, fatigue and fever  Respiratory: Negative for choking, wheezing and stridor  Cardiovascular: Negative for chest pain, palpitations and leg swelling  Gastrointestinal: Negative for blood in stool, constipation, diarrhea, nausea and vomiting  Endocrine: Negative for heat intolerance, polydipsia and polyphagia  Genitourinary: Negative for dysuria, enuresis and flank pain  Musculoskeletal: Negative for gait problem, joint swelling and myalgias  Skin: Negative for color change, pallor, rash and wound  Neurological: Negative for seizures, speech difficulty, light-headedness, numbness and headaches  Hematological: Negative for adenopathy  Does not bruise/bleed easily  Psychiatric/Behavioral: Negative for confusion, decreased concentration, dysphoric mood and hallucinations  All other systems reviewed and are negative        Past Medical and Surgical History:     Past Medical History:   Diagnosis Date    Addiction to drug ([facility] )     Alcohol abuse     Bipolar disorder ([facility] )     Chronic pain disorder     Depression     Hallucination     Psychiatric illness     Schizoaffective disorder ([facility] )     Substance abuse ([facility] )     Suicide attempt [facility])        Past Surgical History:   Procedure Laterality Date    KNEE SURGERY         Meds/Allergies:    PTA meds:   Prior to Admission Medications   Prescriptions Last Dose Informant Patient Reported? Taking? ARIPiprazole (Abilify) 10 mg tablet   Yes No   Sig: Take by mouth   acetaminophen (TYLENOL) 500 mg tablet   Yes No   Sig: Take 500 mg by mouth   acetaminophen (TYLENOL) 500 mg tablet   No No   Sig: Take 2 tablets (1,000 mg total) by mouth every 8 (eight) hours as needed for moderate pain   cyclobenzaprine (FLEXERIL) 10 mg tablet   No No   Sig: Take 1 tablet (10 mg total) by mouth 2 (two) times a day as needed for muscle spasms   cyclobenzaprine (FLEXERIL) 10 mg tablet   No No   Sig: Take 1 tablet (10 mg total) by mouth 3 (three) times a day as needed for muscle spasms   divalproex sodium (DEPAKOTE) 250 mg EC tablet   Yes No   Sig: Take 250 mg by mouth every 8 (eight) hours   gabapentin (NEURONTIN) 400 mg capsule   No No   Sig: Take 1 capsule (400 mg total) by mouth 3 (three) times a day   risperiDONE (RisperDAL) 1 mg tablet   No No   Sig: Take 1 tablet (1 mg total) by mouth 2 (two) times a day   topiramate (TOPAMAX) 25 mg tablet   Yes No   Sig: Take 25 mg by mouth 2 (two) times a day      Facility-Administered Medications: None       Allergies:    Allergies   Allergen Reactions    Nsaids Edema       Social History:     Marital Status: Single    Substance Use History:   Social History     Substance and Sexual Activity   Alcohol Use Yes    Alcohol/week: 6 0 standard drinks    Types: 6 Cans of beer per week    Frequency: Monthly or less    Drinks per session: 5 or 6    Comment: states drinking occasionally     Social History     Tobacco Use   Smoking Status Current Every Day Smoker    Packs/day: 1 00    Types: Cigarettes   Smokeless Tobacco Never Used     Social History     Substance and Sexual Activity   Drug Use Yes    Types: "Crack" cocaine    Comment: K2       Family History:    Family History   Family history unknown: Yes       Physical Exam:     Vitals:   Blood Pressure: 114/57 (03/30/21 0735)  Pulse: 67 (03/30/21 0735)  Temperature: 98 1 °F (36 7 °C) (03/30/21 0735)  Temp Source: Tympanic (03/30/21 0735)  Respirations: 18 (03/30/21 0735)  Height: 5' 5" (165 1 cm) (03/29/21 1646)  Weight - Scale: 82 kg (180 lb 12 8 oz) (03/29/21 1646)  SpO2: 98 % (03/29/21 1646)    Physical Exam  Vitals signs and nursing note reviewed  Constitutional:       General: He is not in acute distress  Appearance: Normal appearance  He is not ill-appearing, toxic-appearing or diaphoretic  Cardiovascular:      Rate and Rhythm: Normal rate and regular rhythm  Pulses: Normal pulses  Heart sounds: Normal heart sounds  No murmur  Pulmonary:      Effort: Pulmonary effort is normal  No respiratory distress  Breath sounds: Normal breath sounds  No stridor  No wheezing, rhonchi or rales  Abdominal:      General: Bowel sounds are normal  There is no distension  Palpations: Abdomen is soft  Tenderness: There is no abdominal tenderness  There is no right CVA tenderness, left CVA tenderness or guarding  Musculoskeletal: Normal range of motion  General: No swelling or deformity  Right lower leg: No edema  Left lower leg: No edema  Skin:     General: Skin is warm and dry  Capillary Refill: Capillary refill takes less than 2 seconds  Coloration: Skin is not jaundiced or pale  Findings: No bruising, erythema, lesion or rash  Neurological:      General: No focal deficit present  Mental Status: He is alert  Mental status is at baseline  Cranial Nerves: No cranial nerve deficit  Psychiatric:         Mood and Affect: Mood normal          Thought Content: Thought content normal          Additional Data:     Lab Results: I have personally reviewed pertinent reports        Results from last 7 days   Lab Units 03/27/21  1734   WBC Thousand/uL 4 74   HEMOGLOBIN g/dL 14 6   HEMATOCRIT % 45 2   PLATELETS Thousands/uL 193  193   NEUTROS PCT % 47   LYMPHS PCT % 40   MONOS PCT % 10   EOS PCT % 3     Results from last 7 days   Lab Units 03/28/21  1018 03/27/21  1734   SODIUM mmol/L 144 144   POTASSIUM mmol/L 4 1 4 2   CHLORIDE mmol/L 107 107   CO2 mmol/L 28 26   BUN mg/dL 14 16   CREATININE mg/dL 1 16 1 37*   ANION GAP mmol/L 9 11   CALCIUM mg/dL 8 8 9 0   ALBUMIN g/dL  --  2 9*   TOTAL BILIRUBIN mg/dL  --  0 10*   ALK PHOS U/L  --  72   ALT U/L  --  45   AST U/L  --  28   GLUCOSE RANDOM mg/dL 112 109     Results from last 7 days   Lab Units 03/27/21  1734   INR  0 89         Lab Results   Component Value Date/Time    HGBA1C 5 3 03/27/2021 05:34 PM               Imaging: I have personally reviewed pertinent reports  No orders to display       EKG, Pathology, and Other Studies Reviewed on Admission:   · EKG:     ** Please Note: This note has been constructed using a voice recognition system   **

## 2021-03-30 NOTE — ASSESSMENT & PLAN NOTE
Currently on haldol prn, hydroxyzine prn, Trazodone prn, ativan prn, management per [facility] team

## 2021-03-30 NOTE — H&P
Psychiatric Evaluation - [facility] 31 y o  male MRN: [id number]  Unit/Bed#: [facility] 251-02 Encounter: [id number]    Assessment/Plan   Principal Problem:    Schizoaffective disorder, bipolar type ([address] )  Active Problems:    Medical clearance for psychiatric admission    Right-sided chest wall pain    Tobacco abuse    Hallucinations    Emphysematous bleb of lung (H[facility])    Constipation    Plan:   Start Invega 6 mg PO Daily  Depkaote  mg PO HS  Increase gabapentin to 600 mg TID  Remeron 15 mg PO HS  Check admission labs  Collaborate with family for baseline assessment and disposition planning  Case discussed with treatment team     Treatment options and alternatives were reviewed with the patient, who concurs with the above plan  Risks, benefits, and possible side effects of medications were explained to the patient, and he verbalizes understanding       -----------------------------------    Chief Complaint: "I wanted to die"    History of Present Illness     This is 32 yo male with hx of Schizoaffective disorder and polysubstance use admitted to inpatient psychiatry unit on 3/27 for depression, paranoid delusions and suicidal ideations in the context of substance, psychosocial stressors and substance use  He was transferred to Medicine for chest pain and was discharged back to Psychiatry with out patient recommendations  Patient continues to endorse depressed mood, anxiety, irritability, difficulty to control anger and paranoid  Endorses passive death wishes  Denies any intent or plan  Patient appears anxious, paranoid, entitled and manipulative  As per report patient had verbal altercation with another patient on the unit and wanted to leave  Today he agreed to stay but later wanted to leave again when his needs were not met       Psychiatric Review Of Systems:  Problems with sleep: yes, decreased  Appetite changes: yes, decreased  Weight changes: no  Low energy/anergy: yes  Low interest/pleasure/anhedonia: yes  Somatic symptoms: no  Anxiety/panic: yes  Clare: yes  Guilt/hopeless: yes  Self injurious behavior/risky behavior: yes    Medical Review Of Systems:  Complete review of systems is negative except as noted above  Historical Information     Psychiatric History:   Psychiatric medication trial: Depakote, haldol, risperdal, lithium, xanax  Inpatient hospitalizations: Yes many in the past  Suicide attempts: Yes OD on pills and tried to hang many years ago  Violent behavior: hx of cutting  Outpatient treatment: No    Substance Abuse History:  Social History     Tobacco Use    Smoking status: Current Every Day Smoker     Packs/day: 1 00     Types: Cigarettes    Smokeless tobacco: Never Used   Substance Use Topics    Alcohol use: Yes     Alcohol/week: 6 0 standard drinks     Types: 6 Cans of beer per week     Frequency: Monthly or less     Drinks per session: 5 or 6     Comment: states drinking occasionally    Drug use: Yes     Types: "Crack" cocaine     Comment: K2      Patient reports hx of alcohol and cocaine  He has been clean for many years relapsed recently on Cocaine  UDS + Cocaine   I have assessed this patient for substance use within the past 12 months  I spent time with [facility] in counseling and education on risk of substance abuse  I assessed motivation and encouraged him for treatment as appropriate  Family Psychiatric History:   Patient denies any known family history of psychiatric illness, suicide attempt, or substance abuse    Social History:  Education: high school diploma/GED  Learning Disabilities: denies  Marital history: single  Living arrangement: Presently homeless  Occupational History: employed  Functioning Relationships: alone & isolated    Other Pertinent History: None      Traumatic History:   Abuse: denies  Other Traumatic Events: denies    Past Medical History:   Past Medical History:   Diagnosis Date    Addiction [facility] drug ([facility] )     Alcohol abuse  Bipolar disorder ([place] [address] )     Chronic pain disorder     Depression     Hallucination     Psychiatric illness     [facility]     Substance abuse ([place] [address] )     Suicide attempt ([place] [address] )         -----------------------------------  Objective    Temp:  [97 8 °F (36 6 °C)-98 4 °F (36 9 °C)] 97 8 °F (36 6 °C)  HR:  [67-97] 89  Resp:  [18] 18  BP: (114-120)/(57-66) 120/64    Mental Status Evaluation:  Appearance:  alert, good eye contact and appropriate grooming and hygiene   Behavior:  calm and limited cooperativity   Speech:  spontaneous, normal rate, normal volume and coherent   Mood:  depressed   Affect:  labile, dysphoric and angry   Thought Process:  organized, goal directed   Thought Content: mild paranoia   Perceptual disturbances: no reported hallucinations and does not appear to be responding to internal stimuli at this time   Risk Potential: Passive death wishes, Low potential for aggression based on previous behavior   Sensorium: person, place, time/date, situation, day of week, month of year and year   Memory: recent and remote memory grossly intact   Consciousness: alert   Attention: attention span appeared shorter than expected for age   Insight:  Fair   Judgment: Fair   Gait/Station: normal gait/station   Motor Activity: no abnormal movements     Meds/Allergies   Allergies   Allergen Reactions    Nsaids Edema     all current active meds have been reviewed    Behavioral Health Medications: all current active meds have been reviewed  Changes as above  Laboratory results:  I have personally reviewed all pertinent laboratory/tests results  No results found for this or any previous visit (from the past 48 hour(s))            -----------------------------------    Risks / Benefits of Treatment:     Risks, benefits, and possible side effects of medications explained to patient  The patient verbalizes understanding and agreement for treatment       Counseling / Coordination of Care: Patient's presentation on admission and proposed treatment plan were discussed with the treatment team   Diagnosis, medication changes and treatment plan were reviewed with the patient  Recent stressors were discussed with the patient  Events leading to admission were reviewed with the patient  Importance of medication and treatment compliance was reviewed with the patient

## 2021-03-30 NOTE — PROGRESS NOTES
Pt was given PRN Robaxin for pain and Ativan 1 mg PO at 17:10  Not effective when this RN arrived on the unit  Pt yelling and demanding to leave  Verbally aggressive, threatening to hurt peer

## 2021-03-30 NOTE — ASSESSMENT & PLAN NOTE
Patient admitted to psych for management of hallucinations/schizoaffective disorder  Noted to have upper lobe blebs but hemodynamically stable  No active medical interventions or issues  Can continue with psych management

## 2021-03-30 NOTE — PLAN OF CARE
Problem: Depression  Goal: Refrain from harming self  Description: Interventions:  - Monitor patient closely, per order   - Supervise medication ingestion, monitor effects and side effects   Outcome: Progressing     Problem: Depression  Goal: Attend and participate in unit activities, including therapeutic, recreational, and educational groups  Description: Interventions:  - Provide therapeutic and educational activities daily, encourage attendance and participation, and document same in the medical record   Outcome: Progressing     Problem: Anxiety  Goal: Anxiety is at manageable level  Description: Interventions:  - Assess and monitor patient's anxiety level  - Monitor for signs and symptoms (heart palpitations, chest pain, shortness of breath, headaches, nausea, feeling jumpy, restlessness, irritable, apprehensive)  - Collaborate with interdisciplinary team and initiate plan and interventions as ordered    - [place] patient to unit/surroundings  - Explain treatment plan  - Encourage participation in care  - Encourage verbalization of concerns/fears  - Identify coping mechanisms  - Assist in developing anxiety-reducing skills  - Administer/offer alternative therapies  - Limit or eliminate stimulants  Outcome: Progressing

## 2021-03-30 NOTE — PROGRESS NOTES
Pt went to sleep soon after last note  Held hs medications  Pt was verbally aggressive and many PRNs were given

## 2021-03-30 NOTE — CASE MANAGEMENT
Pt is a 32yo single male admitted 3/29/21 1642 as a 201 from [address] where he was on a medical floor  CM met with pt in order to complete initial intake/assessment and pt presents as anxious  Pt reports multiple IP psych admissions "a long time ago"  Pt reports he was residing at [address] [place] [address] with fiance but that he is now homeless  Pt reports he does not want to go to a shelter  Pt provided a list of rooms for rent and extended stay hotels in [facility] and warming location provided in pt AVS  Pt reports he is unsure if he will have a ride home at time of d/c  Pt reports he does not have a phone currently but will be getting a new one upon d/c     Pt denies currently having an OP provider  Pt reports he is open to a referral but refused to sign a release at this time  [facility] MH and Crisis resources provided in pt AVS     Pt denies having a current CM  Pt reports he is open to a referral but refused to sign a release at this time  Pt denies having a PCP  PCP option for homeless individual provided in pt AVS     Pt AUDIT 2, UDS + Cocaine, PAWSS 0  Pt report he had 18mths clean on cocaine, and relapsed a week ago, using 3-4 times, last use last week  Pt reports "multiple" past rehabs, last 2019 at [facility]  Pt reports using AA/NA in the past and finding it helpful  LC D&A resources provided in pt AVS      COVID resources provided in pt AVS     Transportation Public transportation   [address] home? Y/N with who Pt reports he is unsure at this time   Access to firearms Pt denies    Supports Pt reports grandma Jodie Johnson and friend Mita Land as supports   Legal Pt denies    Education  HS   Employed? Y/N Where Yes, [place]   Income Source/How much Employment 2000 monthly      Stressors/barries/triggers "relationship, not knowing which direction to better myself"  Pt reported "I don't know" when asked about coping skills    Pt reported chief complaint as "hallucinations, depression, and paranoia"  Pt declined to sign any ROIs for supports  Pt requested to contact company regarding debit card, pt provided with cordless to make call  Pt requested options for couples counselors  Pt provided list of couples counselors in \[facility]\"" who are in network with his insurance

## 2021-03-30 NOTE — PROGRESS NOTES
Pt yelling that peer called him a "nigger" and  demanded to leave immediately or he would hurt someone  Again, we explained that pt could not leave  Pt not accepting  RN explained consequences of hitting peer or staff  Pt said he didn't care  Given PRN Robaxin, Atarax 50 mg, Haldol 5 mg and Cogentin 1 mg  Explosive situation and security on unit  All three RNs talking to parties to calm  During this time, there was no abnormality in breathing as he is yelling and threatening to beat up peer and no mentions of pain  Peer RN talked with pt in his room and he began to calm and fell asleep  PRNs effective for use

## 2021-03-30 NOTE — DISCHARGE INSTR - APPOINTMENTS
Judith Tomas RN, our [employer], will be calling you after your discharge, on the phone number that you provided  She will be available as an additional support, if needed  If you wish to speak with her, you may contact Alicja Loo at [phone]

## 2021-03-30 NOTE — ASSESSMENT & PLAN NOTE
Abnormal CXR/CT scan chest findings of biapical lung bullae measuring 7 3 x 7 8cm on the right  Seen and cleared by pulmonary  Encouraged to quit smoking  To follow up with thoracic surgery eval for possible bled resection in the future  To follow up with pulmonary on discharge

## 2021-03-30 NOTE — NURSING NOTE
Pt awake at 0220 with c/o Right sided chest and shoulder pain  Reports sleeping on Right side, making it worse  Pt received scheduled bedtime meds at 0225 due to being medicated on previous shift and being asleep  Pt is calm, cooperative  Received snack with meds and a heat pack

## 2021-03-30 NOTE — PROGRESS NOTES
Pt distressed over his fiance  having a PFA against him, and family issues needs frequent reassurance

## 2021-03-30 NOTE — PROGRESS NOTES
03/30/21 1342   Team Meeting   Meeting Type Daily Rounds   Team Members Present   Team Members Present Physician;Nurse;;Occupational Therapist   Physician Team Member Dr Perfecto Kinney Team Member [facility] Management Team Member Yudy/Marcio/Berna SPENCER Team Member Mu   Patient/Family Present   Patient Present No   Patient's Family Present No   Daily Rounds Note Report- pt new admit 12 [facility]UB came back from medical yesterday, irritable, depressed, not forthcoming, concerns over debit card and housing, explosive situation with peer yesterday, threatened peer, denies SI/HI, PRN's taken Ativan, Revacan, Haldol, Atarax, Cogentin

## 2021-03-30 NOTE — CMS CERTIFICATION NOTE
Recertification: Based upon physical, mental and social evaluations, I certify that inpatient psychiatric services continue to be medically necessary for this patient for a duration of 7 midnights for the treatment of  Schizoaffective disorder, bipolar type [facility])   Available alternative community resources still do not meet the patient's mental health care needs  I further attest that an established written individualized plan of care has been updated and is outlined in the patient's medical records

## 2021-03-30 NOTE — PROGRESS NOTES
Pt was agitated majority of shift demanding papers and wallet stating "I AM GOING TO FLIP OUT " several attempts made to redirect with re-directions lasting very short periods time  Pt refused to answer questions when approached by this writer and walked into room closing door  Will follow up as needed

## 2021-03-30 NOTE — NURSING NOTE
Pt asleep at start of shift, waking once for scheduled meds and snack and returned to sleep  Remains asleep at this time

## 2021-03-31 PROCEDURE — 99232 SBSQ HOSP IP/OBS MODERATE 35: CPT | Performed by: STUDENT IN AN ORGANIZED HEALTH CARE EDUCATION/TRAINING PROGRAM

## 2021-03-31 RX ADMIN — Medication 1 TABLET: at 09:05

## 2021-03-31 RX ADMIN — DOCUSATE SODIUM 100 MG: 100 CAPSULE ORAL at 17:22

## 2021-03-31 RX ADMIN — ACETAMINOPHEN 975 MG: 325 TABLET, FILM COATED ORAL at 16:18

## 2021-03-31 RX ADMIN — THIAMINE HCL TAB 100 MG 100 MG: 100 TAB at 09:05

## 2021-03-31 RX ADMIN — DIVALPROEX SODIUM 500 MG: 500 TABLET, EXTENDED RELEASE ORAL at 21:57

## 2021-03-31 RX ADMIN — HALOPERIDOL LACTATE 5 MG: 5 INJECTION, SOLUTION INTRAMUSCULAR at 12:50

## 2021-03-31 RX ADMIN — FOLIC ACID 1 MG: 1 TABLET ORAL at 09:05

## 2021-03-31 RX ADMIN — ACETAMINOPHEN 975 MG: 325 TABLET, FILM COATED ORAL at 09:04

## 2021-03-31 RX ADMIN — LORAZEPAM 2 MG: 2 INJECTION INTRAMUSCULAR; INTRAVENOUS at 12:51

## 2021-03-31 RX ADMIN — ACETAMINOPHEN 975 MG: 325 TABLET, FILM COATED ORAL at 21:56

## 2021-03-31 RX ADMIN — PALIPERIDONE 6 MG: 3 TABLET, EXTENDED RELEASE ORAL at 09:05

## 2021-03-31 RX ADMIN — MIRTAZAPINE 15 MG: 15 TABLET, FILM COATED ORAL at 21:57

## 2021-03-31 RX ADMIN — GABAPENTIN 600 MG: 300 CAPSULE ORAL at 16:18

## 2021-03-31 RX ADMIN — BENZTROPINE MESYLATE 1 MG: 1 INJECTION INTRAMUSCULAR; INTRAVENOUS at 12:52

## 2021-03-31 RX ADMIN — DOCUSATE SODIUM 100 MG: 100 CAPSULE ORAL at 09:11

## 2021-03-31 RX ADMIN — GABAPENTIN 600 MG: 300 CAPSULE ORAL at 21:57

## 2021-03-31 RX ADMIN — LORAZEPAM 1 MG: 1 TABLET ORAL at 17:23

## 2021-03-31 RX ADMIN — GABAPENTIN 600 MG: 300 CAPSULE ORAL at 09:04

## 2021-03-31 RX ADMIN — MELATONIN TAB 3 MG 6 MG: 3 TAB at 21:57

## 2021-03-31 RX ADMIN — NICOTINE 1 PATCH: 21 PATCH, EXTENDED RELEASE TRANSDERMAL at 09:11

## 2021-03-31 RX ADMIN — LORAZEPAM 1 MG: 1 TABLET ORAL at 09:31

## 2021-03-31 NOTE — CASE MANAGEMENT
CM gathered information and sent email to Mayra Richardson at [facility]) [email or web address] - Phone: [phone] to see if they have any current openings and then JORGE LUIS will discuss this MH/D&A option with pt as he reported he wanted more long term care and support

## 2021-03-31 NOTE — PROGRESS NOTES
Pt lying in bed with a flat affect and reporting right side pain to his chest, worsens with deep inspiration  Pt has been asleep for most of the shift  Pt was encouraged to sit up versus lying down and encouraged to take frequent deep breaths  Lungs clear and diminished bases bilaterally  Chest expansion symmetrical and trachea midline  Pt endorses negative CAH to kill himself  Pt denies SI/HI/VH and verbally contracts for safety  Pt denies having any questions or concerns

## 2021-03-31 NOTE — PROGRESS NOTES
Pt in [address] waiting for medication when he became agitated and started to argue with another male peer, requiring intervention by staff, pt difficult to redirect, hearing voices calling him  Racial slurs, derogatory voices ,pt medicated at this, time, needs frequent reassurance and  Monitoring of voices

## 2021-03-31 NOTE — PROGRESS NOTES
Pt   Aditi Block   Earlier  Requesting a hot pack for right ribcage   Pain  Pt   Returned   To  Bed   And slept remainder  Of  [facility]  Without   Any  Difficulty  Chucho Hilton

## 2021-03-31 NOTE — QUICK NOTE
Medication note; pt given prn ativan at , for c/o anxiety  Pt stated prn was only effective for a short time (during which pt mostly slept)  Pt seen by Dr CHANDLER and came to NS requesting prn "I need a shot to calm me down  I need a shot like they gave me last night " on further questioning pt states he feels angry, agitated, irritable, like he may lose control  Dr CHANDLER instructed RN to administer IM's as ordered  Given Haldol, Ativan, Cogentin IM at 6572  Pt cooperative with admin  States he just wants to sleep  Currently resting in bed

## 2021-03-31 NOTE — PLAN OF CARE
Problem: Depression  Goal: Refrain from harming self  Description: Interventions:  - Monitor patient closely, per order   - Supervise medication ingestion, monitor effects and side effects   Outcome: Progressing  Goal: Refrain from isolation  Description: Interventions:  - Develop a trusting relationship   - Encourage socialization   Outcome: Progressing  Goal: Refrain from self-neglect  Outcome: Progressing  Goal: Attend and participate in unit activities, including therapeutic, recreational, and educational groups  Description: Interventions:  - Provide therapeutic and educational activities daily, encourage attendance and participation, and document same in the medical record   Outcome: Progressing  Goal: Complete daily ADLs, including personal hygiene independently, as able  Description: Interventions:  - Observe, teach, and assist patient with ADLS  -  Monitor and promote a balance of rest/activity, with adequate nutrition and elimination   Outcome: Progressing     Problem: Anxiety  Goal: Anxiety is at manageable level  Description: Interventions:  - Assess and monitor patient's anxiety level  - Monitor for signs and symptoms (heart palpitations, chest pain, shortness of breath, headaches, nausea, feeling jumpy, restlessness, irritable, apprehensive)  - Collaborate with interdisciplinary team and initiate plan and interventions as ordered    - [place] patient to unit/surroundings  - Explain treatment plan  - Encourage participation in care  - Encourage verbalization of concerns/fears  - Identify coping mechanisms  - Assist in developing anxiety-reducing skills  - Administer/offer alternative therapies  - Limit or eliminate stimulants  Outcome: Progressing     Problem: PAIN - ADULT  Goal: Verbalizes/displays adequate comfort level or baseline comfort level  Description: Interventions:  - Encourage patient to monitor pain and request assistance  - Assess pain using appropriate pain scale  - Administer analgesics based on type and severity of pain and evaluate response  - Implement non-pharmacological measures as appropriate and evaluate response  - Consider cultural and social influences on pain and pain management  - Notify physician/advanced practitioner if interventions unsuccessful or patient reports new pain  Outcome: Progressing
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
## 2021-03-31 NOTE — PROGRESS NOTES
Pt feeling depressed and anxious this morning  Anxious r/t discharge planning and expresses desire for "long term" mental health treatment  Pt states things have been difficult lately with multiple stressors  Pt states he could stay with family however does not feel he would be safe and again states he would prefer a long term inpatient stay  RN explained to patient that long term placement is difficult to obtain  Pt expresses understanding  Pt also requests double portions which as been ordered  Pt c/o continued anxiety and irritability after receiving scheduled meds  Pt received PRN Ativan for anxiety  Currently resting in bed with eyes closed  Appears to have been effective

## 2021-03-31 NOTE — PROGRESS NOTES
Progress Note - Behavioral Health   Jenny Cornelius 31 y o  male MRN: [id number]  Unit/Bed#: [facility] 251-02 Encounter: [id number]    Assessment/Plan   Principal Problem:    [facility]ffective [facility]  Active Problems:    Medical clearance for psychiatric admission    Right-sided chest wall pain    Tobacco abuse    Hallucinations    Emphysematous bleb of lung ([facility] )    Constipation      Subjective: Patient was seen, chart reviewed and case discussed with team  As per report patient received IM haldol, ativan and cogentin for agitation  Patient endorses irritability, racing thoughts and paranoia  Patient says that he may lose control and lash out again  He agreed to get PRN IM medications  He is compliant with medications  Denies any side effects       Psychiatric Review of Systems:  Behavior over the last 24 hours:  unchanged  Sleep: poor  Appetite: normal  Medication side effects: No  ROS: no complaints, all others negative    Current Medications:  Current Facility-Administered Medications   Medication Dose Route Frequency    acetaminophen (TYLENOL) tablet 325 mg  325 mg Oral Q6H PRN    acetaminophen (TYLENOL) tablet 975 mg  975 mg Oral TID    aluminum-magnesium hydroxide-simethicone (MYLANTA) oral suspension 30 mL  30 mL Oral Q4H PRN    artificial tear (LUBRIFRESH P M ) ophthalmic ointment 1 application  1 application Both Eyes I2T PRN    haloperidol lactate (HALDOL) injection 2 5 mg  2 5 mg Intramuscular Q6H PRN Max 4/day    And    LORazepam (ATIVAN) injection 1 mg  1 mg Intramuscular Q6H PRN Max 4/day    And    benztropine (COGENTIN) injection 0 5 mg  0 5 mg Intramuscular Q6H PRN Max 4/day    haloperidol lactate (HALDOL) injection 5 mg  5 mg Intramuscular Q4H PRN Max 4/day    And    LORazepam (ATIVAN) injection 2 mg  2 mg Intramuscular Q4H PRN Max 4/day    And    benztropine (COGENTIN) injection 1 mg  1 mg Intramuscular Q4H PRN Max 4/day    benztropine (COGENTIN) injection 1 mg  1 mg Intramuscular Q4H PRN Max 6/day    benztropine (COGENTIN) tablet 1 mg  1 mg Oral Q4H PRN Max 6/day    divalproex sodium (DEPAKOTE ER) 24 hr tablet 500 mg  500 mg Oral HS    docusate sodium (COLACE) capsule 100 mg  100 mg Oral BID    folic acid (FOLVITE) tablet 1 mg  1 mg Oral Daily    gabapentin (NEURONTIN) capsule 600 mg  600 mg Oral TID    haloperidol (HALDOL) tablet 2 mg  2 mg Oral Q4H PRN Max 6/day    haloperidol (HALDOL) tablet 5 mg  5 mg Oral Q6H PRN Max 4/day    haloperidol (HALDOL) tablet 5 mg  5 mg Oral Q4H PRN Max 4/day    hydrOXYzine HCL (ATARAX) tablet 25 mg  25 mg Oral Q6H PRN Max 4/day    hydrOXYzine HCL (ATARAX) tablet 50 mg  50 mg Oral Q4H PRN Max 4/day    Or    LORazepam (ATIVAN) injection 1 mg  1 mg Intramuscular Q4H PRN    LORazepam (ATIVAN) tablet 1 mg  1 mg Oral Q4H PRN Max 6/day    Or    LORazepam (ATIVAN) injection 2 mg  2 mg Intramuscular Q6H PRN Max 3/day    melatonin tablet 6 mg  6 mg Oral HS    methocarbamol (ROBAXIN) tablet 500 mg  500 mg Oral Q6H PRN    mirtazapine (REMERON) tablet 15 mg  15 mg Oral HS    multivitamin-minerals (CENTRUM ADULTS) tablet 1 tablet  1 tablet Oral Daily    nicotine (NICODERM CQ) 21 mg/24 hr TD 24 hr patch 1 patch  1 patch Transdermal Daily    nicotine polacrilex (NICORETTE) gum 2 mg  2 mg Oral Q2H PRN    paliperidone (INVEGA) 24 hr tablet 6 mg  6 mg Oral Daily    polyethylene glycol (MIRALAX) packet 17 g  17 g Oral Daily PRN    senna-docusate sodium (SENOKOT S) 8 6-50 mg per tablet 1 tablet  1 tablet Oral Daily PRN    thiamine tablet 100 mg  100 mg Oral Daily    traZODone (DESYREL) tablet 50 mg  50 mg Oral HS PRN       Behavioral Health Medications: all current active meds have been reviewed  Vitals:  Vitals:    03/31/21 1038   BP: 101/56   Pulse: 84   Resp: 18   Temp: 99 1 °F (37 3 °C)   SpO2:        Laboratory results:  I have personally reviewed all pertinent laboratory/tests results      Mental Status Evaluation:  Appearance:  age appropriate   Behavior:  psychomotor agitation and restless and fidgety   Speech:  normal pitch and normal volume   Mood:  angry, anxious and irritable   Affect:  labile   Language Appropriate   Thought Process:  goal directed and logical   Thought Content:  normal   Perceptual Disturbances: None   Risk Potential: Suicidal Ideations none, Homicidal Ideations none and Potential for Aggression No   Sensorium:  person, place, time/date, situation, day of week, month of year and year   Cognition:  recent and remote memory grossly intact   Consciousness:  alert    Attention: attention span appeared shorter than expected for age   Insight:  fair   Judgment: fair   Gait/Station: normal gait/station   Motor Activity: no abnormal movements     Progress Toward Goals: Progressing    Recommended Treatment: Continue with pharmacotherapy, group therapy, milieu therapy and occupational therapy

## 2021-03-31 NOTE — QUICK NOTE
Medication note:  Pt received prn ativan 1mg po for increased anxiety  Pt reports mildly effective, pt with irritable edge  Discussed coping skills learned to help with anxiety

## 2021-04-01 PROCEDURE — 99232 SBSQ HOSP IP/OBS MODERATE 35: CPT | Performed by: STUDENT IN AN ORGANIZED HEALTH CARE EDUCATION/TRAINING PROGRAM

## 2021-04-01 RX ORDER — PALIPERIDONE 3 MG/1
9 TABLET, EXTENDED RELEASE ORAL DAILY
Status: DISCONTINUED | OUTPATIENT
Start: 2021-04-02 | End: 2021-04-04

## 2021-04-01 RX ADMIN — ACETAMINOPHEN 975 MG: 325 TABLET, FILM COATED ORAL at 21:03

## 2021-04-01 RX ADMIN — DOCUSATE SODIUM 100 MG: 100 CAPSULE ORAL at 17:13

## 2021-04-01 RX ADMIN — MELATONIN TAB 3 MG 6 MG: 3 TAB at 21:03

## 2021-04-01 RX ADMIN — DOCUSATE SODIUM 100 MG: 100 CAPSULE ORAL at 08:42

## 2021-04-01 RX ADMIN — THIAMINE HCL TAB 100 MG 100 MG: 100 TAB at 08:41

## 2021-04-01 RX ADMIN — MIRTAZAPINE 15 MG: 15 TABLET, FILM COATED ORAL at 21:03

## 2021-04-01 RX ADMIN — PALIPERIDONE 6 MG: 3 TABLET, EXTENDED RELEASE ORAL at 08:42

## 2021-04-01 RX ADMIN — LORAZEPAM 1 MG: 1 TABLET ORAL at 17:13

## 2021-04-01 RX ADMIN — Medication 1 TABLET: at 08:43

## 2021-04-01 RX ADMIN — GABAPENTIN 600 MG: 300 CAPSULE ORAL at 16:15

## 2021-04-01 RX ADMIN — GABAPENTIN 600 MG: 300 CAPSULE ORAL at 08:41

## 2021-04-01 RX ADMIN — ACETAMINOPHEN 975 MG: 325 TABLET, FILM COATED ORAL at 16:15

## 2021-04-01 RX ADMIN — ACETAMINOPHEN 975 MG: 325 TABLET, FILM COATED ORAL at 08:42

## 2021-04-01 RX ADMIN — LORAZEPAM 2 MG: 2 INJECTION INTRAMUSCULAR; INTRAVENOUS at 10:28

## 2021-04-01 RX ADMIN — BENZTROPINE MESYLATE 1 MG: 1 INJECTION INTRAMUSCULAR; INTRAVENOUS at 10:28

## 2021-04-01 RX ADMIN — NICOTINE 1 PATCH: 21 PATCH, EXTENDED RELEASE TRANSDERMAL at 09:11

## 2021-04-01 RX ADMIN — FOLIC ACID 1 MG: 1 TABLET ORAL at 08:42

## 2021-04-01 RX ADMIN — DIVALPROEX SODIUM 500 MG: 500 TABLET, EXTENDED RELEASE ORAL at 21:03

## 2021-04-01 RX ADMIN — GABAPENTIN 600 MG: 300 CAPSULE ORAL at 21:03

## 2021-04-01 RX ADMIN — HALOPERIDOL LACTATE 5 MG: 5 INJECTION, SOLUTION INTRAMUSCULAR at 10:28

## 2021-04-01 NOTE — CASE MANAGEMENT
CM went to meet with pt [facility] but he was in his room sound asleep  JORGE LUIS will try to meet with pt later in the day to assist with phone call
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
## 2021-04-01 NOTE — CASE MANAGEMENT
CM called Raegan Arenas - Admission Specialist at [facility]  He requested that CM fax him pt clinical information to fax# [phone] and they will review and see if pt is appropriate  Sagrario Mark reported that he has meetings for next 2 hours and requested that CM have pt call him after 12pm to complete phone interview

## 2021-04-01 NOTE — NURSING NOTE
Pt given PRN Ativan 1mg for increased anxiety d/t incident with peer  [address]  On follow up, pt reported mild effectiveness

## 2021-04-01 NOTE — PROGRESS NOTES
Pt is seclusive to his bed and naps frequently  Pt reports to this writer "I'm okay" and that he is "some what" doing his deep breathing as instructed  It was then reinforced to the patient the importance of deep breathing in the prevention of pneumonia  Pt response was "I could just get rid of that"  Lungs clear and diminished bases bilaterally  Chest expansion symmetrical and encouraged to sit up versus lying down  Pt denies SI/HI/AVH and denies having any questions or concerns

## 2021-04-01 NOTE — CONSULTS
Asked to re-evaluate patient for ongoing complaints of chest pain  Reviewed chart remotely, patient was seen by Pulmonology and Internal Medicine, transferred to [facility] for for evaluation of chest pain  Patient does have biapical lung bullae  Evaluated and cleared for return by pulmonology  He should continue smoking cessation  He is at risk for spontaneous pneumothorax due to very large emphysematous bleb at right upper lobe  If there is acutely worsening shortness of breath with hypoxia not due to underlying anxiety, would recommend repeat imaging and transfer to emergency department

## 2021-04-01 NOTE — CASE MANAGEMENT
CM received email back from admissions specialist from [facility], Roger Amaro and he reported "We do have beds available  You can call me anytime "     CM will discuss this option with pt, have him sign JOSE and move forward with referral if he agrees  CM spoke to Alma Rosa Condon from [place]  He was in contact with pt insurance and they reported that [facility] is In Network with his [employer] so he would be able to be referred there  Alma Rosa Condon also had insurance fax CM a dc facility list as a back up if [employer] does not work out

## 2021-04-01 NOTE — PROGRESS NOTES
Pt negative at times with staff  Requested IM's during the day  Pt spent time in his room sleeping or sitting on his bed  Pt had verbal altercation with a peer in the evening  Pt reported negative command hallucinations but contracts for safety       PRN: IM Haldol, Ativan, Cogentin     Dc date not yet determined - CM to speak to pt about referral to [facility] D& inpatient program)          04/01/21 5747   Team Meeting   Meeting Type Daily Rounds   Team Members Present   Team Members Present Physician;Nurse;   Physician Team Member Dr Clare Michaud / Wilmar Guadarrama / Sourav Fernandez Team Member [facility] Management Team Member Rosita Saunders / Rosalinda Bolton   Patient/Family Present   Patient Present No   Patient's Family Present No

## 2021-04-01 NOTE — PROGRESS NOTES
Diagnosis of Schizoaffective disorder, bipolar type     Short term goals for decrease in depressive symptoms, decrease in anxiety symptoms, decrease in paranoid thoughts, decrease in suicidal thoughts, decrease in self abusive behaviors, improvement in insight, sleep improvement discussed  Dc date is not yet determined  CM to discuss dc options with pt including [facility] / D&A treatment)     All present parties in agreement and signed treatment plan        04/01/21 3406   Team Meeting   Meeting Type Tx Team Meeting   Team Members Present   Team Members Present Physician;Nurse;   Physician Team Member Dr Nevaeh Jung Team Member [facility] Management Team Member Berna   Patient/Family Present   Patient Present No (pt declined to meet with team)    Patient's Family Present No

## 2021-04-01 NOTE — NURSING NOTE
Pt asleep throughout later evening  Did not attend group or snack  Compliant with medications  Offered a heat pack due to continued pain to Right side  Pt was pleasant with writer, soft spoken  Returned to room and returned to sleep

## 2021-04-01 NOTE — PROGRESS NOTES
Progress Note - Behavioral Health   Sylvia Almodovar 31 y o  male MRN: [id number]  Unit/Bed#: [facility] 251-02 Encounter: [id number]    Assessment/Plan   Principal Problem:    [facility]  Active Problems:    Medical clearance for psychiatric admission    Right-sided chest wall pain    Tobacco abuse    Hallucinations    Emphysematous bleb of lung ([facility] )    Constipation      Subjective: Patient was seen, chart reviewed and case discussed with team  As per report patient received IM PRN medication yesterday  Patient appears depressed, anxious and dysphoric  Patient says that he is worried about his medical issues and other social stressors  Patient endorses depressed mood, negative voices and passive death wishes in the context of medical issues  Denies SI/HI/VH       Psychiatric Review of Systems:  Behavior over the last 24 hours:  unchanged  Sleep: normal  Appetite: normal  Medication side effects: No  ROS: chest pain, all others negative    Current Medications:  Current Facility-Administered Medications   Medication Dose Route Frequency    acetaminophen (TYLENOL) tablet 325 mg  325 mg Oral Q6H PRN    acetaminophen (TYLENOL) tablet 975 mg  975 mg Oral TID    aluminum-magnesium hydroxide-simethicone (MYLANTA) oral suspension 30 mL  30 mL Oral Q4H PRN    artificial tear (LUBRIFRESH P M ) ophthalmic ointment 1 application  1 application Both Eyes P0T PRN    haloperidol lactate (HALDOL) injection 2 5 mg  2 5 mg Intramuscular Q6H PRN Max 4/day    And    LORazepam (ATIVAN) injection 1 mg  1 mg Intramuscular Q6H PRN Max 4/day    And    benztropine (COGENTIN) injection 0 5 mg  0 5 mg Intramuscular Q6H PRN Max 4/day    haloperidol lactate (HALDOL) injection 5 mg  5 mg Intramuscular Q4H PRN Max 4/day    And    LORazepam (ATIVAN) injection 2 mg  2 mg Intramuscular Q4H PRN Max 4/day    And    benztropine (COGENTIN) injection 1 mg  1 mg Intramuscular Q4H PRN Max 4/day    benztropine (COGENTIN) injection 1 mg  1 mg Intramuscular Q4H PRN Max 6/day    benztropine (COGENTIN) tablet 1 mg  1 mg Oral Q4H PRN Max 6/day    divalproex sodium (DEPAKOTE ER) 24 hr tablet 500 mg  500 mg Oral HS    docusate sodium (COLACE) capsule 100 mg  100 mg Oral BID    folic acid (FOLVITE) tablet 1 mg  1 mg Oral Daily    gabapentin (NEURONTIN) capsule 600 mg  600 mg Oral TID    haloperidol (HALDOL) tablet 2 mg  2 mg Oral Q4H PRN Max 6/day    haloperidol (HALDOL) tablet 5 mg  5 mg Oral Q6H PRN Max 4/day    haloperidol (HALDOL) tablet 5 mg  5 mg Oral Q4H PRN Max 4/day    hydrOXYzine HCL (ATARAX) tablet 25 mg  25 mg Oral Q6H PRN Max 4/day    hydrOXYzine HCL (ATARAX) tablet 50 mg  50 mg Oral Q4H PRN Max 4/day    Or    LORazepam (ATIVAN) injection 1 mg  1 mg Intramuscular Q4H PRN    LORazepam (ATIVAN) tablet 1 mg  1 mg Oral Q4H PRN Max 6/day    Or    LORazepam (ATIVAN) injection 2 mg  2 mg Intramuscular Q6H PRN Max 3/day    melatonin tablet 6 mg  6 mg Oral HS    methocarbamol (ROBAXIN) tablet 500 mg  500 mg Oral Q6H PRN    mirtazapine (REMERON) tablet 15 mg  15 mg Oral HS    multivitamin-minerals (CENTRUM ADULTS) tablet 1 tablet  1 tablet Oral Daily    nicotine (NICODERM CQ) 21 mg/24 hr TD 24 hr patch 1 patch  1 patch Transdermal Daily    nicotine polacrilex (NICORETTE) gum 2 mg  2 mg Oral Q2H PRN    paliperidone (INVEGA) 24 hr tablet 6 mg  6 mg Oral Daily    polyethylene glycol (MIRALAX) packet 17 g  17 g Oral Daily PRN    senna-docusate sodium (SENOKOT S) 8 6-50 mg per tablet 1 tablet  1 tablet Oral Daily PRN    thiamine tablet 100 mg  100 mg Oral Daily    traZODone (DESYREL) tablet 50 mg  50 mg Oral HS PRN       Behavioral Health Medications: all current active meds have been reviewed  Vitals:  Vitals:    04/01/21 0833   BP:    Pulse:    Resp:    Temp:    SpO2: 95%       Laboratory results:  I have personally reviewed all pertinent laboratory/tests results      Mental Status Evaluation:  Appearance:  age appropriate   Behavior:  guarded   Speech:  normal pitch and normal volume   Mood:  anxious and depressed   Affect:  constricted   Language Appropriate   Thought Process:  goal directed and logical   Thought Content:  paranoia   Perceptual Disturbances: None   Risk Potential: Suicidal Ideations none, Homicidal Ideations none and Potential for Aggression No   Sensorium:  person, place, time/date, situation, day of week, month of year and year   Cognition:  recent and remote memory grossly intact   Consciousness:  alert    Attention: attention span appeared shorter than expected for age   Insight:  fair   Judgment: fair   Gait/Station: normal gait/station   Motor Activity: no abnormal movements     Progress Toward Goals: Progressing    Recommended Treatment: Continue with pharmacotherapy, group therapy, milieu therapy and occupational therapy  1  Increase Invega to 9 mg PO Daily

## 2021-04-01 NOTE — PLAN OF CARE
Problem: Depression  Goal: Refrain from harming self  Description: Interventions:  - Monitor patient closely, per order   - Supervise medication ingestion, monitor effects and side effects   Outcome: Progressing  Goal: Refrain from isolation  Description: Interventions:  - Develop a trusting relationship   - Encourage socialization   Outcome: Progressing  Goal: Refrain from self-neglect  Outcome: Progressing  Goal: Attend and participate in unit activities, including therapeutic, recreational, and educational groups  Description: Interventions:  - Provide therapeutic and educational activities daily, encourage attendance and participation, and document same in the medical record   Outcome: Progressing  Goal: Complete daily ADLs, including personal hygiene independently, as able  Description: Interventions:  - Observe, teach, and assist patient with ADLS  -  Monitor and promote a balance of rest/activity, with adequate nutrition and elimination   Outcome: Progressing     Problem: Anxiety  Goal: Anxiety is at manageable level  Description: Interventions:  - Assess and monitor patient's anxiety level  - Monitor for signs and symptoms (heart palpitations, chest pain, shortness of breath, headaches, nausea, feeling jumpy, restlessness, irritable, apprehensive)  - Collaborate with interdisciplinary team and initiate plan and interventions as ordered    - [place] patient to unit/surroundings  - Explain treatment plan  - Encourage participation in care  - Encourage verbalization of concerns/fears  - Identify coping mechanisms  - Assist in developing anxiety-reducing skills  - Administer/offer alternative therapies  - Limit or eliminate stimulants  Outcome: Progressing     Problem: PAIN - ADULT  Goal: Verbalizes/displays adequate comfort level or baseline comfort level  Description: Interventions:  - Encourage patient to monitor pain and request assistance  - Assess pain using appropriate pain scale  - Administer analgesics based on type and severity of pain and evaluate response  - Implement non-pharmacological measures as appropriate and evaluate response  - Consider cultural and social influences on pain and pain management  - Notify physician/advanced practitioner if interventions unsuccessful or patient reports new pain  Outcome: Progressing

## 2021-04-01 NOTE — CASE MANAGEMENT
CM met with pt to check in and discuss [facility] as an after care option  Pt is in agreement with starting the referral process for [facility] and signed JOSE for CM to contact them to start process  Pt reported that "I want to focus on my mental health, I have a lot going on "     Pt also informed CM that he recently took a leave of absence from work at [employer]

## 2021-04-01 NOTE — CASE MANAGEMENT
CM faxed clinical information to [facility] at fax# [phone] for review

## 2021-04-01 NOTE — PROGRESS NOTES
Depressed demeanor  Denies SI/HI/AVH  Complaining of pain to B/L chest upon taking deep breaths that he describes as "pain that takes his breath away " Physician and PA made aware  Patient verbalizes decreased motivation to attend groups and reports that he just feels like sleeping  Also reports feeling anxious and agitated  He did express having thoughts of wanting to leave, writer provided patient  with emotional support at this time

## 2021-04-01 NOTE — PROGRESS NOTES
Pt seen by Dr CHANDLER and again c/o negative voices/thoughts, feelings of agitation, increasing anxiety and requesting IM medication  IM haldol, ativan and cogentin given at 1028 per Dr Pacheco Baldwinres  Pt verbalized to RN feeling as if he has nothing to live for  Afraid of relapsing, worries about current lung issues and what will happen to him if he smokes  States he doesn't want to return to the kind of life he was living  Has difficulty seeing any hope for the future  Contemplating going to the [facility] after d/c  Pt rested in room after IM's, pt reports effective in relieving agitation  Remains with irritable edge and negativity, does not want to interact with other pt's "I just need to stay away from them  I can't be around them"

## 2021-04-02 LAB — VALPROATE SERPL-MCNC: 32 UG/ML (ref 50–100)

## 2021-04-02 PROCEDURE — 80164 ASSAY DIPROPYLACETIC ACD TOT: CPT | Performed by: STUDENT IN AN ORGANIZED HEALTH CARE EDUCATION/TRAINING PROGRAM

## 2021-04-02 PROCEDURE — 99232 SBSQ HOSP IP/OBS MODERATE 35: CPT | Performed by: PHYSICIAN ASSISTANT

## 2021-04-02 RX ORDER — GABAPENTIN 300 MG/1
600 CAPSULE ORAL 3 TIMES DAILY
Qty: 180 CAPSULE | Refills: 1 | Status: SHIPPED | OUTPATIENT
Start: 2021-04-02

## 2021-04-02 RX ORDER — MIRTAZAPINE 15 MG/1
15 TABLET, FILM COATED ORAL
Qty: 30 TABLET | Refills: 1 | Status: SHIPPED | OUTPATIENT
Start: 2021-04-02

## 2021-04-02 RX ORDER — DIVALPROEX SODIUM 500 MG/1
500 TABLET, EXTENDED RELEASE ORAL
Qty: 30 TABLET | Refills: 1 | Status: SHIPPED | OUTPATIENT
Start: 2021-04-02 | End: 2021-04-05

## 2021-04-02 RX ORDER — HYDROXYZINE HYDROCHLORIDE 25 MG/1
25 TABLET, FILM COATED ORAL EVERY 8 HOURS PRN
Qty: 30 TABLET | Refills: 0 | Status: SHIPPED | OUTPATIENT
Start: 2021-04-02

## 2021-04-02 RX ORDER — LANOLIN ALCOHOL/MO/W.PET/CERES
6 CREAM (GRAM) TOPICAL
Qty: 60 TABLET | Refills: 1 | Status: SHIPPED | OUTPATIENT
Start: 2021-04-02

## 2021-04-02 RX ORDER — DOCUSATE SODIUM 100 MG/1
100 CAPSULE, LIQUID FILLED ORAL 2 TIMES DAILY
Qty: 60 CAPSULE | Refills: 0 | Status: SHIPPED | OUTPATIENT
Start: 2021-04-02

## 2021-04-02 RX ORDER — PALIPERIDONE 9 MG/1
9 TABLET, EXTENDED RELEASE ORAL DAILY
Qty: 30 TABLET | Refills: 1 | Status: SHIPPED | OUTPATIENT
Start: 2021-04-03 | End: 2021-04-05 | Stop reason: HOSPADM

## 2021-04-02 RX ADMIN — GABAPENTIN 600 MG: 300 CAPSULE ORAL at 08:40

## 2021-04-02 RX ADMIN — METHOCARBAMOL 500 MG: 500 TABLET ORAL at 16:07

## 2021-04-02 RX ADMIN — FOLIC ACID 1 MG: 1 TABLET ORAL at 08:40

## 2021-04-02 RX ADMIN — GABAPENTIN 600 MG: 300 CAPSULE ORAL at 22:25

## 2021-04-02 RX ADMIN — THIAMINE HCL TAB 100 MG 100 MG: 100 TAB at 08:40

## 2021-04-02 RX ADMIN — NICOTINE 1 PATCH: 21 PATCH, EXTENDED RELEASE TRANSDERMAL at 08:42

## 2021-04-02 RX ADMIN — GABAPENTIN 600 MG: 300 CAPSULE ORAL at 14:20

## 2021-04-02 RX ADMIN — DIVALPROEX SODIUM 500 MG: 500 TABLET, EXTENDED RELEASE ORAL at 22:25

## 2021-04-02 RX ADMIN — ACETAMINOPHEN 975 MG: 325 TABLET, FILM COATED ORAL at 22:24

## 2021-04-02 RX ADMIN — HYDROXYZINE HYDROCHLORIDE 50 MG: 50 TABLET, FILM COATED ORAL at 14:27

## 2021-04-02 RX ADMIN — BENZTROPINE MESYLATE 1 MG: 1 INJECTION INTRAMUSCULAR; INTRAVENOUS at 16:04

## 2021-04-02 RX ADMIN — PALIPERIDONE 9 MG: 3 TABLET, EXTENDED RELEASE ORAL at 08:39

## 2021-04-02 RX ADMIN — MIRTAZAPINE 15 MG: 15 TABLET, FILM COATED ORAL at 22:25

## 2021-04-02 RX ADMIN — LORAZEPAM 2 MG: 2 INJECTION INTRAMUSCULAR; INTRAVENOUS at 16:04

## 2021-04-02 RX ADMIN — DOCUSATE SODIUM 100 MG: 100 CAPSULE ORAL at 08:39

## 2021-04-02 RX ADMIN — DOCUSATE SODIUM 100 MG: 100 CAPSULE ORAL at 22:24

## 2021-04-02 RX ADMIN — MELATONIN TAB 3 MG 6 MG: 3 TAB at 22:25

## 2021-04-02 RX ADMIN — ACETAMINOPHEN 975 MG: 325 TABLET, FILM COATED ORAL at 16:07

## 2021-04-02 RX ADMIN — HALOPERIDOL LACTATE 5 MG: 5 INJECTION, SOLUTION INTRAMUSCULAR at 16:04

## 2021-04-02 RX ADMIN — Medication 1 TABLET: at 08:40

## 2021-04-02 RX ADMIN — ACETAMINOPHEN 975 MG: 325 TABLET, FILM COATED ORAL at 08:39

## 2021-04-02 NOTE — PROGRESS NOTES
Pt requested IM medications during the day and then was requesting them again in the evening  Pt denies SI/HI  Pt upset while talking on the phone to his girlfriend  Pt also having difficulty with other peers       Dc: today  (?) pt interested in going to the [facility] for treatment       04/02/21 [address] Type Daily Rounds   Team Members Present   Team Members Present Physician;Nurse;   Physician Team Member Dr Nickie Nolasco / Devin Hernandez / Walter Perez Team Member [facility] Management Team Member Liana Hogan / Mel Haskins   Patient/Family Present   Patient Present No   Patient's Family Present No

## 2021-04-02 NOTE — PROGRESS NOTES
Progress Note - Behavioral Health   Jenny Cornelius 31 y o  male MRN: [id number]  Unit/Bed#: [facility] 251-02 Encounter: [id number]    Assessment/Plan   Principal Problem:    Schizoaffective disorder, bipolar type ([facility] )  Active Problems:    Medical clearance for psychiatric admission    Right-sided chest wall pain    Tobacco abuse    Hallucinations    Emphysematous bleb of lung ([facility] )    Constipation      Subjective: Patient was seen, chart reviewed and case discussed with team   Patient negative in treatment but was more cooperative during interview  Able to be redirected and answered questions appropriately  States he feels depression has decreased slightly and denied suicidal thoughts today  Reports feeling hopeless, helpless, has anhedonia, and low energy  Not seen attending groups  Is somewhat future oriented and trying to get his social stressors in line  Reports anxiety is high  Reports auditory hallucinations of voices have decreased  Denied other forms of hallucinations  Did not appear paranoid  Not currently manic  Did require IM medication later in the day  Medication compliant  Appears to be tolerating medications well without serious side effects        Psychiatric Review of Systems:  Behavior over the last 24 hours:  Some improvement  Sleep: normal  Appetite: normal  Medication side effects: No  ROS: no complaints, all others negative    Current Medications:  Current Facility-Administered Medications   Medication Dose Route Frequency    acetaminophen (TYLENOL) tablet 325 mg  325 mg Oral Q6H PRN    acetaminophen (TYLENOL) tablet 975 mg  975 mg Oral TID    aluminum-magnesium hydroxide-simethicone (MYLANTA) oral suspension 30 mL  30 mL Oral Q4H PRN    artificial tear (LUBRIFRESH P M ) ophthalmic ointment 1 application  1 application Both Eyes R1T PRN    haloperidol lactate (HALDOL) injection 2 5 mg  2 5 mg Intramuscular Q6H PRN Max 4/day    And    LORazepam (ATIVAN) injection 1 mg  1 mg Intramuscular Q6H PRN Max 4/day    And    benztropine (COGENTIN) injection 0 5 mg  0 5 mg Intramuscular Q6H PRN Max 4/day    haloperidol lactate (HALDOL) injection 5 mg  5 mg Intramuscular Q4H PRN Max 4/day    And    LORazepam (ATIVAN) injection 2 mg  2 mg Intramuscular Q4H PRN Max 4/day    And    benztropine (COGENTIN) injection 1 mg  1 mg Intramuscular Q4H PRN Max 4/day    benztropine (COGENTIN) injection 1 mg  1 mg Intramuscular Q4H PRN Max 6/day    benztropine (COGENTIN) tablet 1 mg  1 mg Oral Q4H PRN Max 6/day    divalproex sodium (DEPAKOTE ER) 24 hr tablet 500 mg  500 mg Oral HS    docusate sodium (COLACE) capsule 100 mg  100 mg Oral BID    folic acid (FOLVITE) tablet 1 mg  1 mg Oral Daily    gabapentin (NEURONTIN) capsule 600 mg  600 mg Oral TID    haloperidol (HALDOL) tablet 2 mg  2 mg Oral Q4H PRN Max 6/day    haloperidol (HALDOL) tablet 5 mg  5 mg Oral Q6H PRN Max 4/day    haloperidol (HALDOL) tablet 5 mg  5 mg Oral Q4H PRN Max 4/day    hydrOXYzine HCL (ATARAX) tablet 25 mg  25 mg Oral Q6H PRN Max 4/day    hydrOXYzine HCL (ATARAX) tablet 50 mg  50 mg Oral Q4H PRN Max 4/day    Or    LORazepam (ATIVAN) injection 1 mg  1 mg Intramuscular Q4H PRN    LORazepam (ATIVAN) tablet 1 mg  1 mg Oral Q4H PRN Max 6/day    Or    LORazepam (ATIVAN) injection 2 mg  2 mg Intramuscular Q6H PRN Max 3/day    melatonin tablet 6 mg  6 mg Oral HS    methocarbamol (ROBAXIN) tablet 500 mg  500 mg Oral Q6H PRN    mirtazapine (REMERON) tablet 15 mg  15 mg Oral HS    multivitamin-minerals (CENTRUM ADULTS) tablet 1 tablet  1 tablet Oral Daily    nicotine (NICODERM CQ) 21 mg/24 hr TD 24 hr patch 1 patch  1 patch Transdermal Daily    nicotine polacrilex (NICORETTE) gum 2 mg  2 mg Oral Q2H PRN    paliperidone (INVEGA) 24 hr tablet 9 mg  9 mg Oral Daily    polyethylene glycol (MIRALAX) packet 17 g  17 g Oral Daily PRN    senna-docusate sodium (SENOKOT S) 8 6-50 mg per tablet 1 tablet  1 tablet Oral Daily PRN    thiamine tablet 100 mg  100 mg Oral Daily    traZODone (DESYREL) tablet 50 mg  50 mg Oral HS PRN       Behavioral Health Medications: all current active meds have been reviewed and continue current psychiatric medications  Vitals:  Vitals:    04/02/21 1531   BP: 124/74   Pulse: 103   Resp: 17   Temp: 98 1 °F (36 7 °C)   SpO2:        Laboratory results:    I have personally reviewed all pertinent laboratory/tests results  Most Recent Labs:   Lab Results   Component Value Date    WBC 4 74 03/27/2021    RBC 4 68 03/27/2021    HGB 14 6 03/27/2021    HCT 45 2 03/27/2021     03/27/2021     03/27/2021    RDW 13 2 03/27/2021    NEUTROABS 2 16 03/27/2021    SODIUM 144 03/28/2021    K 4 1 03/28/2021     03/28/2021    CO2 28 03/28/2021    BUN 14 03/28/2021    CREATININE 1 16 03/28/2021    GLUC 112 03/28/2021    CALCIUM 8 8 03/28/2021    AST 28 03/27/2021    ALT 45 03/27/2021    ALKPHOS 72 03/27/2021    TP 6 5 03/27/2021    ALB 2 9 (L) 03/27/2021    TBILI 0 10 (L) 03/27/2021    VALPROICTOT 32 (L) 04/02/2021    UNR1DDRQAMIW 1 106 03/27/2021    HGBA1C 5 3 03/27/2021     03/27/2021       Mental Status Evaluation:  Appearance:  casually dressed   Behavior:  less guarded and more cooperative during interview   Speech:  normal pitch and normal volume   Mood:  irritable   Affect:  increased in range   Language Appropriate   Thought Process:  goal directed and linear   Thought Content:  normal   Perceptual Disturbances: decreased AH of voices  Denied other forms   Risk Potential: Denied SI/HI    Potential for aggression: No   Sensorium:  person, place and time/date   Cognition:  recent and remote memory grossly intact   Consciousness:  alert and awake    Attention: attention span and concentration were age appropriate   Insight:  poor   Judgment: partial   Gait/Station: normal gait/station and normal balance   Motor Activity: no abnormal movements     Progress Toward Goals: slow progression    Recommended Treatment: Continue with pharmacotherapy, group therapy, milieu therapy and occupational therapy  1   Continue current medications  2  Extensive disposition planning     Risks, benefits and possible side effects of Medications:   Risks, benefits, and possible side effects of medications explained to patient and patient verbalizes understanding

## 2021-04-02 NOTE — DISCHARGE INSTRUCTIONS
Chronic Lung Disease and Infection Prevention   WHAT YOU NEED TO KNOW:   A chronic lung condition can make infections such as a cold or the flu serious  These infections can cause more damage to your lungs  One episode of pneumonia puts you at risk to have more respiratory infections in the future  DISCHARGE INSTRUCTIONS:   What you can do to prevent respiratory infections:   · Wash your hands often  This is the most important thing you can do to prevent infections  Wash your hands several times each day  Wash after you use the bathroom, change a child's diaper, and before you prepare or eat food  Use soap and water every time you wash your hands  Rub your soapy hands together, lacing your fingers  Use the fingers of one hand to scrub under the nails of the other hand  Wash for at least 20 seconds  Rinse with warm, running water for several seconds  Then dry your hands with a clean towel or paper towel  Use hand  that contains alcohol if soap and water are not available  Do not touch your eyes, nose, or mouth without washing your hands first          · Cover a sneeze or cough  Use a tissue that covers your mouth and nose  Throw the tissue away in a trash can right away  Use the bend of your arm if a tissue is not available  Then wash your hands well with soap and water or use a hand   Do not stand close to anyone who is sneezing or coughing  · Avoid crowds during flu season  Flu season is from late October to the middle of March  Do not have close contact with someone who is sick  Ask friends and family to visit only when they are not sick  · Ask about vaccines you may need  Vaccines help protect you and others around you from some infections  ? Get the influenza (flu) vaccine as soon as recommended each year  The flu vaccine is available starting in September or October  Flu viruses change, so it is important to get a flu vaccine every year      ? Get the pneumonia vaccine if recommended  This vaccine is usually recommended every 5 years  Your provider will tell you when to get this vaccine, if needed  ? Talk to your healthcare provider about your vaccine history  Tell him or her if you did not get certain vaccines as a child, or you did not get all recommended doses  Tell him or her if you do not know your vaccine history  He or she will tell you which vaccines you need, and when to get them  What else you can do to stay healthy:   · Do not smoke  Nicotine and other chemicals in cigarettes and cigars can cause lung damage  Ask your healthcare provider for information if you currently smoke and need help to quit  E-cigarettes or smokeless tobacco still contain nicotine  Talk to your healthcare provider before you use these products  · Build resistance to infection  Eat a variety of healthy foods such as fruits, vegetables, and whole-grain foods  Choose dairy foods, meat, and other protein foods that are low in fat  Get plenty of sleep and physical activity, such as exercise  Your healthcare provider can help you create an exercise plan that is right for you  · Protect your mouth from germs that lead to infection  Brush your teeth at least 2 times per day  See your dentist at least every 6 months  Follow up with your doctor or lung specialist as directed:  Write down your questions so you remember to ask them during your visits  © Copyright [address] Information is for End User's use only and may not be sold, redistributed or otherwise used for commercial purposes  All illustrations and images included in CareNotes® are the copyrighted property of A D A M , Inc  or [facility] Jen Keane   The above information is an  only  It is not intended as medical advice for individual conditions or treatments  Talk to your doctor, nurse or pharmacist before following any medical regimen to see if it is safe and effective for you      Schizoaffective Disorder   WHAT YOU NEED TO KNOW:   Schizoaffective disorder is a long-term mental illness that may change how you think, feel, and act around others  You may not know what is real and what is not real    DISCHARGE INSTRUCTIONS:   Medicines:   · Antipsychotics: These medicines help decrease psychotic symptoms or severe agitation  You may need antiparkinson medicine to control muscle stiffness, twitches, and restlessness caused by antipsychotic medicines  · Antianxiety medicine: This medicine may be given to decrease anxiety and help you feel calm and relaxed  · Antidepressants: These medicines are given to decrease or stop the symptoms of depression, anxiety, and behavior problems  · Mood stabilizers: These medicines help control mood swings  · Anticonvulsants: This medicine is given to control seizures  It may also be used to decrease violent behavior and control your mood swings  · Blood pressure medicines: These may be used to help decrease motor tics (uncontrolled movements)  They may also help you feel calmer, more focused, and less irritable  · Anticholinergics: This medicine decreases the side effects of other medicines  · Take your medicine as directed  Contact your healthcare provider if you think your medicine is not helping or if you have side effects  Tell him or her if you are allergic to any medicine  Keep a list of the medicines, vitamins, and herbs you take  Include the amounts, and when and why you take them  Bring the list or the pill bottles to follow-up visits  Carry your medicine list with you in case of an emergency  Follow up with your healthcare provider or psychiatrist as directed: You may need to return to have your blood pressure and other symptoms checked  You may need blood tests to check the level of medicine in your blood  Write down your questions so you remember to ask them during your visits  Manage your symptoms:   The following may help you feel better or prevent symptoms of schizoaffective disorder from coming back:  · Find support for yourself and your family:  Talk with others to help you cope with your illness better  This may also help to improve how you relate to others  · Keep all medical appointments: This will help manage your disease and the side-effects from medicines you may be taking  · Use your medicines as directed:  Put your medicines in a pillbox placed in an area you can easily see  Use a watch with an alarm to help you remember when it is time to take your medicine  Tell your healthcare provider if you know or think you might be pregnant  Do not stop taking your medicines without your healthcare provider's okay  A sudden stop can cause serious medical problems  · Watch for early signs of a relapse and seek help immediately:      ? How you think, feel, and see things has changed  ? You behave differently than usual     ? You become more nervous and upset, but do not know why     ? You eat less and have trouble sleeping  ? You have little or no interest in friends or activities  For support and more information:   · [employer]  [facility], [facility] [address] , [address]  Phone: [phone]  Phone: [phone]  Web Address: [employer] com br  [employer]    · [address] [facility], Office of [employer]  [address] [place] [address] Ana M, [address]  [place], [place] [address]   Phone: [phone]  Phone: [phone]  Web Address: [facility] tn  Contact your healthcare provider or psychiatrist if:   · You think you are having a relapse  · You are having side effects from your medicine, or they are not helping  · You are not sleeping well or are sleeping more than usual     · You cannot eat or are eating more than usual     · You have muscle spasms, stiffness, or trouble walking      · Your sad feelings or thoughts change the way you function during the day  · You have questions or concerns about your condition or care  Seek care immediately or call 911 if:   · You feel like hurting or killing yourself or others  · You feel that your condition is getting worse  · You feel very upset, threaten someone, or you feel violent  · You suddenly have changes in your vision  · You suddenly have chest pain, trouble breathing, or a fever  © Copyright [address] Information is for End User's use only and may not be sold, redistributed or otherwise used for commercial purposes  All illustrations and images included in CareNotes® are the copyrighted property of A D A M , Inc  or [address] [place] [address]  The above information is an  only  It is not intended as medical advice for individual conditions or treatments  Talk to your doctor, nurse or pharmacist before following any medical regimen to see if it is safe and effective for you

## 2021-04-02 NOTE — PLAN OF CARE
Problem: Depression  Goal: Refrain from harming self  Description: Interventions:  - Monitor patient closely, per order   - Supervise medication ingestion, monitor effects and side effects   Outcome: Progressing  Goal: Refrain from isolation  Description: Interventions:  - Develop a trusting relationship   - Encourage socialization   Outcome: Progressing  Goal: Refrain from self-neglect  Outcome: Progressing  Goal: Attend and participate in unit activities, including therapeutic, recreational, and educational groups  Description: Interventions:  - Provide therapeutic and educational activities daily, encourage attendance and participation, and document same in the medical record   Outcome: Progressing  Goal: Complete daily ADLs, including personal hygiene independently, as able  Description: Interventions:  - Observe, teach, and assist patient with ADLS  -  Monitor and promote a balance of rest/activity, with adequate nutrition and elimination   Outcome: Progressing     Problem: Anxiety  Goal: Anxiety is at manageable level  Description: Interventions:  - Assess and monitor patient's anxiety level  - Monitor for signs and symptoms (heart palpitations, chest pain, shortness of breath, headaches, nausea, feeling jumpy, restlessness, irritable, apprehensive)  - Collaborate with interdisciplinary team and initiate plan and interventions as ordered    - [place] patient to unit/surroundings  - Explain treatment plan  - Encourage participation in care  - Encourage verbalization of concerns/fears  - Identify coping mechanisms  - Assist in developing anxiety-reducing skills  - Administer/offer alternative therapies  - Limit or eliminate stimulants  Outcome: Progressing     Problem: PAIN - ADULT  Goal: Verbalizes/displays adequate comfort level or baseline comfort level  Description: Interventions:  - Encourage patient to monitor pain and request assistance  - Assess pain using appropriate pain scale  - Administer analgesics based on type and severity of pain and evaluate response  - Implement non-pharmacological measures as appropriate and evaluate response  - Consider cultural and social influences on pain and pain management  - Notify physician/advanced practitioner if interventions unsuccessful or patient reports new pain  Outcome: Progressing

## 2021-04-02 NOTE — CASE MANAGEMENT
CM met with pt to inform him of the decision from [facility] and that they would like to see pt more stable, and have a few more days of No IM injections and improved staff and peer interactions  Pt was able to process that information and then was going back and forth on what he wanted to do  Pt reported many times "I just need to get out of here, but I can't go anywhere without my money, and I don't have my phone "     CM assisted pt in him trying to gain access to his debit card and trying to order a new one as he lost both his phone and his debit card  Pt reported that if he had his money he could go stay somewhere in the community  Pt called his Debit card customer service, tried to transfer his money via [employer] but it was declined as his debit card was put on hold as he told them previously that it was stolen  Pt was told a new card will be mailed to him in 7-10 days  Pt became frustrated and agitated during phone calls and that it has been so difficult to get his money  Pt had called his dad Damian earlier in the day to see if he can stay there but dad's phone kept going to voicemail  Pt reported "I cannot go stay with family without money, I need to get my money " Pt became determined and fixated on gaining access to his debit card and money but he kept being denied since he does not have his cell phone and does not have access to his gmail  Pt dad called back to speak to him  He reported that he has supported pt in the past multiple times and has allowed him to stay there in the past but dad reported he has his own life and business and is not able to have pt come there at this time  Pt reported that his grandmother is not an option either  CM spoke to pt about [facility] or the [facility] [employer]  Pt reported that he will stay on the unit  Pt became down, upset and negative due to his situation and what he feels as lack of support   Pt reported feeling like family is not there when they need him, but he did acknowledge that his own actions and behaviors have put him in this situation in his life  CM will follow up with pt on 4/5/2021 to determine if CM can send updated clinical information to [facility] for review

## 2021-04-02 NOTE — PROGRESS NOTES
Pt remains scant/guarded during interaction  Denies SI/HI/AVH, though voices feelings of anger and frustration that he was not able to be discharged today  Reports elevated anxiety and feeling severely agitated  Pt was offered PO medications (Ativan and Haldol) for anxiety and agitation  Pt appeared to get even more agitated, clenching fists and requesting medication via injection  PRN Haldol, Ativan and Cogentin administered IM in the left deltoid and left quad  Pt remained visibly agitated and began c/o 10/10 right ribcage/flank pain  Pt refused to elaborate further only stating "I just don't feel right "  Vital signs stable (see 4/2/2021 16:37 flow sheet)  Pt administered scheduled Tylenol and PRN Robaxin for pain  Pt advised to rest in bed and allow medications to take effect  MD made aware  Pt re-assessed approximately 15 minutes later and was observed in bed, in no apparent distress  Pt stated that anxiety and agitation were decreased and that pain level was lower 8/10  Pt offered and accepted radio and was observed to be sleeping at 17:10

## 2021-04-02 NOTE — PROGRESS NOTES
Pt scant/guarded in conversation  Reports sleeping well over night  Expressing readiness to discharge  Denies SI/HI/AVH  Denies questions/concerns

## 2021-04-02 NOTE — CASE MANAGEMENT
CM met with pt to check in regarding dc plans  Pt was frustrated and reported due to altercations on the unit he "wants to get out of here " "I have other things going on I just got a call about last night " Pt did not elaborate  Pt spoke to CM about feeling "attacked, I accept everyone and I do not see color and I am getting told by staff about things that other people are initiating and I am retaliating, no one knows my past and what I have been through "     Pt continues to be open to going to [facility] and would like to speak to admissions today  Pt requested that he go home first to gather his belongings and then go to the [facility] if accepted  CM informed pt about the [facility] not being sure if they have transportation and that they would bill pt for transportation  CM spoke to pt about calling Admissions Director, Ana Ghotra at the [facility] to do phone assessment and determine if he is appropriate for their program and if they can arrange for pt to be admitted

## 2021-04-02 NOTE — NURSING NOTE
Pt approached RN requesting IM PRN twice with RN  Pt requesting scheduled medications to be IM  Pt educated scheduled medications are scheduled PO and will be administered at the scheduled time

## 2021-04-02 NOTE — PROGRESS NOTES
Pt received Atarax 50 mg PO at 1430 for moderate anxiety  PRN ineffective  At [id number], pt received Haldol 5 mg IM, Ativan 2 mg IM, and Cogentin 1 mg IM for severe agitation, PRNs effective  Pt received Robaxin 500 mg PO at 1607 for muscle spasms, PRN partially effective

## 2021-04-02 NOTE — PROGRESS NOTES
Pt went into his room and slammed the door after a telephone call with his fiance  Pt is verbalizing "I have to leave right now"  Pt reports to this writer "she needs me right now"  Pt was reminded that she has a PFA against him, pt responded "I don't care"  Pt continued to report "I won't be able to sleep tonight"  Pt denies SI/HI and is able to verbally contract for safety

## 2021-04-02 NOTE — CASE MANAGEMENT
JORGE LUIS received a call from NANCI at [facility]  He reported that he reviewed information with his clinical team and they will have to deny pt at this time  He reported that they will need pt to have a few good days, 3-4 days of no IM injections and decreased confrontations with staff and peers  NANCI reported that they can reassess pt on Monday or Tuesday and review notes from over the weekend and then make a determination  JORGE LUIS will follow up with NANCI if pt decided to stay for the weekend

## 2021-04-02 NOTE — CASE MANAGEMENT
CM met with pt to check in again  He reported that he just spoke to his dad Tara Ba on the phone and wanted to see if CM can call with him to see if it would be an option for him to stay with his dad for a few weeks  Dad reported that he was in the middle of something and asked CM and pt to call back in about an hour  Pt then completed phone assessment with Judith Presley at [facility] and answered all questions that Judith Presley asked and was able to talk to him about his past and what he is looking for with treatment  Pt spoke about the struggles he has been having with peers on the unit and the difficulties he has in life  After conversation, Judith Presley reported that he will review pt information with his clinical staff and then will call CM back if pt can be accepted or not  Judith Presley reported that he has a few concerns about pt just receiving IM medication yesterday and that he has had conflict with peers  He reported he will still discuss with his team and call CM back

## 2021-04-02 NOTE — PROGRESS NOTES
Progress Note - Behavioral Health   Felisha Waterman 31 y o  male MRN: [id number]  Unit/Bed#: [facility] 251-02 Encounter: [id number]    The patient was seen for continuing care and reviewed with treatment team     Current Mental Status Evaluation:  Appearance:  Adequate hygiene and grooming   Behavior:  Irritable but redirectable   Mood:  irritable   Affect: mood-congruent   Speech: Normal rate and Normal volume   Thought Process:  Goal directed and coherent   Thought Content:  Focused on his pulmonary problems   Perceptual Disturbances: Denies hallucinations and does not appear to be responding to internal stimuli   Risk Potential: Potentially aggresive and violent   Orientation:   Person, place, situation     Progress Toward Goals:  Per RN report he has been irritable, labile, will get into verbal altercations with peers  Focused on receiving IM PRN and needed PRN yesterday when his discharge to [facility] fell through  He reports chest discomfort but appears comfortable and not in distress and no coughing noted, will cont to monitor vital signs and pulse-ox  He is reluctant to have medications changed "you keep giving me all these medications and nothing works " States in the past best medications were klonopin and xanax  Will hold off on increase of vpa for now, check pulse ox qshift and ordered EKG  Principal Problem:    Schizoaffective disorder, bipolar type ([facility] )  Active Problems:    Medical clearance for psychiatric admission    Right-sided chest wall pain    Tobacco abuse    Hallucinations    Emphysematous bleb of lung ([facility] )    Constipation      Recommended Treatment: Continue with pharmacotherapy, group therapy, milieu therapy and occupational therapy    The patient will be maintained on the following medications:  Current Facility-Administered Medications   Medication Dose Route Frequency Provider Last Rate    acetaminophen  325 mg Oral Q6H PRN Martinez Harman PA-C      acetaminophen  975 mg Oral TID [facility], PA-C      aluminum-magnesium hydroxide-simethicone  30 mL Oral Q4H PRN [facility], PA-C      artificial tear  1 application Both Eyes G4K PRN [facility], PA-C      haloperidol lactate  2 5 mg Intramuscular Q6H PRN Max 4/day [facility], PA-C      And    LORazepam  1 mg Intramuscular Q6H PRN Max 4/day [facility], PA-C      And    benztropine  0 5 mg Intramuscular Q6H PRN Max 4/day [facility], PA-C      haloperidol lactate  5 mg Intramuscular Q4H PRN Max 4/day [facility], PA-C      And    LORazepam  2 mg Intramuscular Q4H PRN Max 4/day [facility], PA-C      And    benztropine  1 mg Intramuscular Q4H PRN Max 4/day [facility], PA-C      benztropine  1 mg Intramuscular Q4H PRN Max 6/day [facility], PA-C      benztropine  1 mg Oral Q4H PRN Max 6/day [facility], PA-C      divalproex sodium  500 mg Oral HS Elizabeth Marvin MD      docusate sodium  100 mg Oral BID [facility], PA-C      folic acid  1 mg Oral Daily [facility], PA-C      gabapentin  600 mg Oral TID Elizabeth Marvin MD      haloperidol  2 mg Oral Q4H PRN Max 6/day [facility], PA-C      haloperidol  5 mg Oral Q6H PRN Max 4/day [facility], PA-C      haloperidol  5 mg Oral Q4H PRN Max 4/day [facility], PA-C      hydrOXYzine HCL  25 mg Oral Q6H PRN Max 4/day [facility], PA-C      hydrOXYzine HCL  50 mg Oral Q4H PRN Max 4/day [facility], PA-C      Or    LORazepam  1 mg Intramuscular Q4H PRN [facility], PA-C      LORazepam  1 mg Oral Q4H PRN Max 6/day [facility], PA-C      Or    LORazepam  2 mg Intramuscular Q6H PRN Max 3/day [facility], PA-C      melatonin  6 mg Oral HS [facility], [place]      methocarbamol  500 mg Oral Q6H PRN [facility], PA-C      mirtazapine  15 mg Oral HS Elizabeth Marvin MD      multivitamin-minerals  1 tablet Oral Daily [facility] [place] [facility] [place]      nicotine  1 patch Transdermal Daily [facility] [place] [place]      nicotine polacrilex  2 mg Oral Q2H PRN [facility] [place] [place]      paliperidone  9 mg Oral Daily Calvin Nelson MD      polyethylene glycol  17 g Oral Daily PRN [facility] PA-C      senna-docusate sodium  1 tablet Oral Daily PRN [facility], NATASHA      thiamine  100 mg Oral Daily [facility] [place]      traZODone  50 mg Oral HS PRN [facility] Champion PA-C

## 2021-04-02 NOTE — BH TRANSITION RECORD
Contact Information: If you have any questions, concerns, pended studies, tests and/or procedures, or emergencies regarding your inpatient behavioral health visit  Please contact [place] behavioral [facility] unit [phone] and ask to speak to a , nurse or physician  A contact is available 24 hours/ 7 days a week at this number  Summary of Procedures Performed During your Stay:  Below is a list of major procedures performed during your hospital stay and a summary of results:  - Cardiac Procedures/Studies: EKG  Pending Studies (From admission, onward)    None        If studies are pending at discharge, follow up with your PCP and/or referring provider

## 2021-04-03 PROCEDURE — 93005 ELECTROCARDIOGRAM TRACING: CPT

## 2021-04-03 PROCEDURE — 99232 SBSQ HOSP IP/OBS MODERATE 35: CPT | Performed by: PSYCHIATRY & NEUROLOGY

## 2021-04-03 RX ADMIN — HYDROXYZINE HYDROCHLORIDE 50 MG: 50 TABLET, FILM COATED ORAL at 18:12

## 2021-04-03 RX ADMIN — FOLIC ACID 1 MG: 1 TABLET ORAL at 08:05

## 2021-04-03 RX ADMIN — DOCUSATE SODIUM AND SENNOSIDES 1 TABLET: 8.6; 5 TABLET ORAL at 16:24

## 2021-04-03 RX ADMIN — METHOCARBAMOL 500 MG: 500 TABLET ORAL at 08:58

## 2021-04-03 RX ADMIN — DIVALPROEX SODIUM 500 MG: 500 TABLET, EXTENDED RELEASE ORAL at 21:34

## 2021-04-03 RX ADMIN — MELATONIN TAB 3 MG 6 MG: 3 TAB at 21:35

## 2021-04-03 RX ADMIN — METHOCARBAMOL 500 MG: 500 TABLET ORAL at 15:56

## 2021-04-03 RX ADMIN — THIAMINE HCL TAB 100 MG 100 MG: 100 TAB at 08:05

## 2021-04-03 RX ADMIN — ACETAMINOPHEN 975 MG: 325 TABLET, FILM COATED ORAL at 15:55

## 2021-04-03 RX ADMIN — LORAZEPAM 1 MG: 1 TABLET ORAL at 22:14

## 2021-04-03 RX ADMIN — GABAPENTIN 600 MG: 300 CAPSULE ORAL at 15:55

## 2021-04-03 RX ADMIN — ACETAMINOPHEN 975 MG: 325 TABLET, FILM COATED ORAL at 21:34

## 2021-04-03 RX ADMIN — LORAZEPAM 1 MG: 1 TABLET ORAL at 08:58

## 2021-04-03 RX ADMIN — NICOTINE 1 PATCH: 21 PATCH, EXTENDED RELEASE TRANSDERMAL at 08:02

## 2021-04-03 RX ADMIN — POLYETHYLENE GLYCOL 3350 17 G: 17 POWDER, FOR SOLUTION ORAL at 18:20

## 2021-04-03 RX ADMIN — MIRTAZAPINE 15 MG: 15 TABLET, FILM COATED ORAL at 21:35

## 2021-04-03 RX ADMIN — GABAPENTIN 600 MG: 300 CAPSULE ORAL at 08:04

## 2021-04-03 RX ADMIN — PALIPERIDONE 9 MG: 3 TABLET, EXTENDED RELEASE ORAL at 08:03

## 2021-04-03 RX ADMIN — HYDROXYZINE HYDROCHLORIDE 50 MG: 50 TABLET, FILM COATED ORAL at 12:38

## 2021-04-03 RX ADMIN — Medication 1 TABLET: at 08:05

## 2021-04-03 RX ADMIN — GABAPENTIN 600 MG: 300 CAPSULE ORAL at 21:35

## 2021-04-03 RX ADMIN — BENZTROPINE MESYLATE 1 MG: 1 TABLET ORAL at 08:59

## 2021-04-03 RX ADMIN — HALOPERIDOL 5 MG: 5 TABLET ORAL at 09:00

## 2021-04-03 RX ADMIN — HALOPERIDOL 2 MG: 2 TABLET ORAL at 22:14

## 2021-04-03 RX ADMIN — DOCUSATE SODIUM 100 MG: 100 CAPSULE ORAL at 17:01

## 2021-04-03 RX ADMIN — ACETAMINOPHEN 975 MG: 325 TABLET, FILM COATED ORAL at 08:04

## 2021-04-03 RX ADMIN — DOCUSATE SODIUM 100 MG: 100 CAPSULE ORAL at 08:03

## 2021-04-03 NOTE — TREATMENT TEAM
04/03/21 0700   Team Meeting   Meeting Type Daily Rounds   Team Members Present   Team Members Present Physician;Nurse   Physician Team Member [address] [facility]   Nursing Team Member [address] Bell   Patient/Family Present   Patient Present No   Patient's Family Present No     Daily Rounds: Pt labile in mood  Increased agitation and anxiety yesterday r/t discharge planning  Received IM HAC, slept after  Also c/o 10/10 rib/flank pain, received Tylenol/Robaxin  Slept overnight

## 2021-04-03 NOTE — PROGRESS NOTES
Progress Note - Behavioral Health   Juan Daniel Olson 31 y o  male MRN: [id number]  Unit/Bed#: [facility] 251-02 Encounter: [id number]    The patient was seen for continuing care and reviewed with treatment team     Current Mental Status Evaluation:  Appearance:  Adequate hygiene and grooming   Behavior:  Uncooperative   Mood:  irritable   Affect: labile   Speech: Sparse and Normal volume   Thought Process:  Goal directed and coherent   Thought Content:  Does not verbalize delusional material   Perceptual Disturbances: Denies hallucinations and does not appear to be responding to internal stimuli   Risk Potential: No suicidal or homicidal ideation reported spontaneously, patient ended interview prior to asking   Orientation:   Person, place, situtation     Progress Toward Goals: Per RN report she was able to be redirected with radio and PO medications  Has been isolated to room, Pulse ox 94 when last checked  Irritable this AM when interviewed prior to lunch reports mood is "so-so", pain is "so-so " Will increase VPA tonight and reduce invega at patient request due to sedation  Principal Problem:    Schizoaffective disorder, [facility] type ([facility] )  Active Problems:    Medical clearance for psychiatric admission    Right-sided chest wall pain    Tobacco abuse    Hallucinations    Emphysematous bleb of lung ([facility] [address] )    Constipation      Recommended Treatment: Continue with pharmacotherapy, group therapy, milieu therapy and occupational therapy    The patient will be maintained on the following medications:  Current Facility-Administered Medications   Medication Dose Route Frequency Provider Last Rate    acetaminophen  325 mg Oral Q6H PRN Humphrey Holcomb PA-C      acetaminophen  975 mg Oral TID Humphrey Holcomb PA-C      aluminum-magnesium hydroxide-simethicone  30 mL Oral Q4H PRN Humphrey Holcomb PA-C      artificial tear  1 application Both Eyes H1Z PRN Humphrey Holcomb PA-C      haloperidol lactate  2 5 mg Intramuscular Q6H PRN Max 4/day [facility] NATASHA Ramirez      And    benztropine  0 5 mg Intramuscular Q6H PRN Max 4/day [facility] NATASHA Ramirez      haloperidol lactate  5 mg Intramuscular Q4H PRN Max 4/day [facility] NATASHA Ramirez      And    benztropine  1 mg Intramuscular Q4H PRN Max 4/day [facility] NATASHA Ramirez      benztropine  1 mg Intramuscular Q4H PRN Max 6/day [facility] NATASHA Ramirez      benztropine  1 mg Oral Q4H PRN Max 6[facility]      divalproex sodium  500 mg Oral HS Annalee Weber MD      docusate sodium  100 mg Oral BID [facility]      folic acid  1 mg Oral Daily [place], [place]      gabapentin  600 mg Oral TID Annalee Weber MD      haloperidol  2 mg Oral Q4H PRN Max 6/day [facility]      haloperidol  5 mg Oral Q6H PRN Max 4/day [facility] NATASHA Ramirez      haloperidol  5 mg Oral Q4H PRN Max 4/day [facility]      hydrOXYzine HCL  25 mg Oral Q6H PRN Max 4/day [facility]      hydrOXYzine HCL  50 mg Oral Q4H PRN Max 4/day [facility] NATASHA Ramirez      Or    LORazepam  1 mg Intramuscular Q4H PRN [facility]      LORazepam  1 mg Oral Q4H PRN Max 6/day [facility] NATASHA Ramirez      Or    LORazepam  2 mg Intramuscular Q6H PRN Max 3/day [facility] NATASHA Ramirez      melatonin  6 mg Oral HS [place], [place]      methocarbamol  500 mg Oral Q6H PRN [facility]      mirtazapine  15 mg Oral HS Annalee Weber MD      multivitamin-minerals  1 tablet Oral Daily [place], [place]      nicotine  1 patch Transdermal Daily [place], [place]      nicotine polacrilex  2 mg Oral Q2H PRN [facility]      paliperidone  9 mg Oral Daily Annalee Weber MD      polyethylene glycol  17 g Oral Daily [facility]      senna-docusate sodium  1 tablet Oral Daily PRN NATASHA Stringer Nine thiamine  100 mg Oral Daily Yvon Patel      traZODone  50 mg Oral HS PRN Ofe Ghotra PA-C

## 2021-04-03 NOTE — PROGRESS NOTES
Pt isolative to room listening to radio  Social with roommate  Pleasant and polite in conversation, remains with flat affect  Denies SI/HI/AVH  Pt continues to c/o right-side rib pain, accepted PRN Robaxin and scheduled Tylenol  Pt denies concerns at this time, content listening to ball-game on radio  Appropriate behaviors

## 2021-04-03 NOTE — PROGRESS NOTES
Pt with depressed/flat affect, polite during interaction  Reports good sleep after medications last evening/night  Denies SI/HI/AVH  C/o right-side ribcage/flank pain, offered and accepted PRN Robaxin  Pt became agitated during breakfast, accusing peer of spitting on food  Peer denies behavior and behavior was not witnessed by staff or other peers  Pt able to remain in control however was agitated  Agreeable to PRN (Haldol, Ativan, Cogentin) PO  Provided with a radio and Pt observed resting in bed within ten minutes  Will continue to monitor and support

## 2021-04-04 LAB
ATRIAL RATE: 94 BPM
P AXIS: 50 DEGREES
PR INTERVAL: 144 MS
QRS AXIS: -11 DEGREES
QRSD INTERVAL: 68 MS
QT INTERVAL: 360 MS
QTC INTERVAL: 450 MS
T WAVE AXIS: 17 DEGREES
VENTRICULAR RATE: 94 BPM

## 2021-04-04 PROCEDURE — 99232 SBSQ HOSP IP/OBS MODERATE 35: CPT | Performed by: PSYCHIATRY & NEUROLOGY

## 2021-04-04 PROCEDURE — 93010 ELECTROCARDIOGRAM REPORT: CPT | Performed by: INTERNAL MEDICINE

## 2021-04-04 RX ORDER — ACETAMINOPHEN 325 MG/1
325 TABLET ORAL EVERY 6 HOURS PRN
Status: DISCONTINUED | OUTPATIENT
Start: 2021-04-04 | End: 2021-04-07 | Stop reason: HOSPADM

## 2021-04-04 RX ORDER — DIPHENHYDRAMINE HCL 25 MG
25 TABLET ORAL ONCE AS NEEDED
Status: DISCONTINUED | OUTPATIENT
Start: 2021-04-04 | End: 2021-04-07 | Stop reason: HOSPADM

## 2021-04-04 RX ORDER — ACETAMINOPHEN 325 MG/1
975 TABLET ORAL EVERY 6 HOURS PRN
Status: DISCONTINUED | OUTPATIENT
Start: 2021-04-04 | End: 2021-04-07 | Stop reason: HOSPADM

## 2021-04-04 RX ORDER — LIDOCAINE 50 MG/G
1 PATCH TOPICAL DAILY
Status: DISCONTINUED | OUTPATIENT
Start: 2021-04-04 | End: 2021-04-04

## 2021-04-04 RX ORDER — PALIPERIDONE 3 MG/1
6 TABLET, EXTENDED RELEASE ORAL DAILY
Status: DISCONTINUED | OUTPATIENT
Start: 2021-04-05 | End: 2021-04-07 | Stop reason: HOSPADM

## 2021-04-04 RX ADMIN — THIAMINE HCL TAB 100 MG 100 MG: 100 TAB at 08:55

## 2021-04-04 RX ADMIN — POLYETHYLENE GLYCOL 3350 17 G: 17 POWDER, FOR SOLUTION ORAL at 13:29

## 2021-04-04 RX ADMIN — DICLOFENAC SODIUM 2 G: 10 GEL TOPICAL at 18:11

## 2021-04-04 RX ADMIN — MIRTAZAPINE 15 MG: 15 TABLET, FILM COATED ORAL at 21:43

## 2021-04-04 RX ADMIN — DICLOFENAC SODIUM 2 G: 10 GEL TOPICAL at 13:29

## 2021-04-04 RX ADMIN — GABAPENTIN 600 MG: 300 CAPSULE ORAL at 15:14

## 2021-04-04 RX ADMIN — FOLIC ACID 1 MG: 1 TABLET ORAL at 08:55

## 2021-04-04 RX ADMIN — NICOTINE 1 PATCH: 21 PATCH, EXTENDED RELEASE TRANSDERMAL at 08:58

## 2021-04-04 RX ADMIN — Medication 1 TABLET: at 08:55

## 2021-04-04 RX ADMIN — DIVALPROEX SODIUM 750 MG: 500 TABLET, EXTENDED RELEASE ORAL at 21:43

## 2021-04-04 RX ADMIN — PALIPERIDONE 9 MG: 3 TABLET, EXTENDED RELEASE ORAL at 08:55

## 2021-04-04 RX ADMIN — DOCUSATE SODIUM 100 MG: 100 CAPSULE ORAL at 18:11

## 2021-04-04 RX ADMIN — ACETAMINOPHEN 975 MG: 325 TABLET, FILM COATED ORAL at 19:54

## 2021-04-04 RX ADMIN — GABAPENTIN 600 MG: 300 CAPSULE ORAL at 08:55

## 2021-04-04 RX ADMIN — DICLOFENAC SODIUM 2 G: 10 GEL TOPICAL at 21:43

## 2021-04-04 RX ADMIN — DOCUSATE SODIUM 100 MG: 100 CAPSULE ORAL at 08:55

## 2021-04-04 RX ADMIN — GABAPENTIN 600 MG: 300 CAPSULE ORAL at 21:43

## 2021-04-04 RX ADMIN — MELATONIN TAB 3 MG 6 MG: 3 TAB at 21:43

## 2021-04-04 NOTE — PLAN OF CARE
Problem: Depression  Goal: Refrain from harming self  Description: Interventions:  - Monitor patient closely, per order   - Supervise medication ingestion, monitor effects and side effects   Outcome: Progressing  Goal: Refrain from isolation  Description: Interventions:  - Develop a trusting relationship   - Encourage socialization   Outcome: Progressing  Goal: Refrain from self-neglect  Outcome: Progressing  Goal: Attend and participate in unit activities, including therapeutic, recreational, and educational groups  Description: Interventions:  - Provide therapeutic and educational activities daily, encourage attendance and participation, and document same in the medical record   Outcome: Progressing  Goal: Complete daily ADLs, including personal hygiene independently, as able  Description: Interventions:  - Observe, teach, and assist patient with ADLS  -  Monitor and promote a balance of rest/activity, with adequate nutrition and elimination   Outcome: Progressing     Problem: Anxiety  Goal: Anxiety is at manageable level  Description: Interventions:  - Assess and monitor patient's anxiety level  - Monitor for signs and symptoms (heart palpitations, chest pain, shortness of breath, headaches, nausea, feeling jumpy, restlessness, irritable, apprehensive)  - Collaborate with interdisciplinary team and initiate plan and interventions as ordered    - [place] patient to unit/surroundings  - Explain treatment plan  - Encourage participation in care  - Encourage verbalization of concerns/fears  - Identify coping mechanisms  - Assist in developing anxiety-reducing skills  - Administer/offer alternative therapies  - Limit or eliminate stimulants  Outcome: Progressing     Problem: PAIN - ADULT  Goal: Verbalizes/displays adequate comfort level or baseline comfort level  Description: Interventions:  - Encourage patient to monitor pain and request assistance  - Assess pain using appropriate pain scale  - Administer analgesics based on type and severity of pain and evaluate response  - Implement non-pharmacological measures as appropriate and evaluate response  - Consider cultural and social influences on pain and pain management  - Notify physician/advanced practitioner if interventions unsuccessful or patient reports new pain  Outcome: Progressing     Problem: Risk for Violence/Aggression Toward Others  Goal: Treatment Goal: Refrain from acts of violence/aggression during length of stay, and demonstrate improved impulse control at the time of discharge  Outcome: Progressing  Goal: Refrain from harming others  Outcome: Progressing  Goal: Refrain from destructive acts on the environment or property  Outcome: Progressing  Goal: Control angry outbursts  Description: Interventions:  - Monitor patient closely, per order  - Ensure early verbal de-escalation  - Monitor prn medication needs  - Set reasonable/therapeutic limits, outline behavioral expectations, and consequences   - Provide a non-threatening milieu, utilizing the least restrictive interventions   Outcome: Progressing

## 2021-04-04 NOTE — PROGRESS NOTES
Pt pleasant during interactions with staff and select peers  Less isolative this shift, visible in the dayroom watching television with peers and did attend group this afternoon  C/o constipation, received Miralax per MD order, medication not yet effective  Reports right-side rib pain remains, however is more tolerable  Denies SI/HI/AVH  Compliant with scheduled medications  Remains hopeful to discharge to "[facility]"

## 2021-04-04 NOTE — PROGRESS NOTES
Pleasant and calm  Reports sleeping well over night  Reports right side rib pain persists, but is slowly improving  Denies SI/HI/AVH  Remains hopeful to discharge 4/5  Denies concerns at this time

## 2021-04-04 NOTE — PLAN OF CARE
Problem: Depression  Goal: Refrain from harming self  Description: Interventions:  - Monitor patient closely, per order   - Supervise medication ingestion, monitor effects and side effects   4/4/2021 1737 by Claire Kimble RN  Outcome: Progressing  4/4/2021 1633 by Claire Kimble RN  Outcome: Progressing  Goal: Refrain from isolation  Description: Interventions:  - Develop a trusting relationship   - Encourage socialization   4/4/2021 1737 by Claire Kimble RN  Outcome: Progressing  4/4/2021 1633 by Claire Kimble RN  Outcome: Progressing  Goal: Refrain from self-neglect  4/4/2021 1737 by Claire Kimble RN  Outcome: Progressing  4/4/2021 1633 by Claire Kimble RN  Outcome: Progressing  Goal: Attend and participate in unit activities, including therapeutic, recreational, and educational groups  Description: Interventions:  - Provide therapeutic and educational activities daily, encourage attendance and participation, and document same in the medical record   4/4/2021 1737 by Claire Kimble RN  Outcome: Progressing  4/4/2021 1633 by Claire Kimble RN  Outcome: Progressing  Goal: Complete daily ADLs, including personal hygiene independently, as able  Description: Interventions:  - Observe, teach, and assist patient with ADLS  -  Monitor and promote a balance of rest/activity, with adequate nutrition and elimination   4/4/2021 1737 by Claire Kimble RN  Outcome: Progressing  4/4/2021 1633 by Claire Kimble RN  Outcome: Progressing     Problem: Anxiety  Goal: Anxiety is at manageable level  Description: Interventions:  - Assess and monitor patient's anxiety level  - Monitor for signs and symptoms (heart palpitations, chest pain, shortness of breath, headaches, nausea, feeling jumpy, restlessness, irritable, apprehensive)  - Collaborate with interdisciplinary team and initiate plan and interventions as ordered    - [place] patient to unit/surroundings  - Explain treatment plan  - Encourage participation in care  - Encourage verbalization of concerns/fears  - Identify coping mechanisms  - Assist in developing anxiety-reducing skills  - Administer/offer alternative therapies  - Limit or eliminate stimulants  4/4/2021 1737 by Shruthi Edwards RN  Outcome: Progressing  4/4/2021 1633 by Shruthi Edwards RN  Outcome: Progressing     Problem: PAIN - ADULT  Goal: Verbalizes/displays adequate comfort level or baseline comfort level  Description: Interventions:  - Encourage patient to monitor pain and request assistance  - Assess pain using appropriate pain scale  - Administer analgesics based on type and severity of pain and evaluate response  - Implement non-pharmacological measures as appropriate and evaluate response  - Consider cultural and social influences on pain and pain management  - Notify physician/advanced practitioner if interventions unsuccessful or patient reports new pain  4/4/2021 1737 by Shruthi Edwards RN  Outcome: Progressing  4/4/2021 1633 by Shruthi Edwards RN  Outcome: Progressing     Problem: Risk for Violence/Aggression Toward Others  Goal: Treatment Goal: Refrain from acts of violence/aggression during length of stay, and demonstrate improved impulse control at the time of discharge  4/4/2021 1737 by Shruthi Edwards RN  Outcome: Progressing  4/4/2021 1633 by Shruthi Edwards RN  Outcome: Progressing  Goal: Refrain from harming others  4/4/2021 1737 by Shruthi Edwards RN  Outcome: Progressing  4/4/2021 1633 by Shruthi Edwards RN  Outcome: Progressing  Goal: Refrain from destructive acts on the environment or property  4/4/2021 1737 by Shruthi Edwards RN  Outcome: Progressing  4/4/2021 1633 by Shruthi Edwards RN  Outcome: Progressing  Goal: Control angry outbursts  Description: Interventions:  - Monitor patient closely, per order  - Ensure early verbal de-escalation  - Monitor prn medication needs  - Set reasonable/therapeutic limits, outline behavioral expectations, and consequences   - Provide a non-threatening milieu, utilizing the least restrictive interventions   4/4/2021 1737 by Edwin Gamez, RN  Outcome: Progressing  4/4/2021 1633 by Edwin Gamez, RN  Outcome: Progressing

## 2021-04-04 NOTE — PROGRESS NOTES
Was given Ativan 1 mg po prn/anxiety (Ortiz=27) & Haldol 2 mg po prn mild agitation (Agitated Behavioral Scale=3) at 2214  Good effect obtained, as observed asleep in bed at the onset of 11 PM-7 AM shift & remains asleep presently  Will continue to monitor

## 2021-04-04 NOTE — TREATMENT TEAM
04/04/21 0700   Team Meeting   Meeting Type Daily Rounds   Team Members Present   Team Members Present Physician;Nurse   Physician Team Member [address]   Nursing Team Member RV   Patient/Family Present   Patient Present No   Patient's Family Present No     AM rounds: Briefly agitated with peer in the morning, able to refrain from altercation with peer, agreeable to PO Haldol, Ativan and Cogentin  Denies SI/HI/AVH  C/o right side rib pain, received PRN Robaxin and scheduled Tylenol  Atarax in evening from elevated anxiety  Mood improved later in day  Slept

## 2021-04-05 PROCEDURE — 99232 SBSQ HOSP IP/OBS MODERATE 35: CPT | Performed by: PHYSICIAN ASSISTANT

## 2021-04-05 RX ORDER — DIVALPROEX SODIUM 250 MG/1
TABLET, EXTENDED RELEASE ORAL
Qty: 30 TABLET | Refills: 1 | Status: SHIPPED | OUTPATIENT
Start: 2021-04-05

## 2021-04-05 RX ORDER — PALIPERIDONE 6 MG/1
6 TABLET, EXTENDED RELEASE ORAL DAILY
Qty: 30 TABLET | Refills: 1 | Status: SHIPPED | OUTPATIENT
Start: 2021-04-06

## 2021-04-05 RX ORDER — DIVALPROEX SODIUM 500 MG/1
TABLET, EXTENDED RELEASE ORAL
Qty: 30 TABLET | Refills: 1 | Status: SHIPPED | OUTPATIENT
Start: 2021-04-05

## 2021-04-05 RX ADMIN — THIAMINE HCL TAB 100 MG 100 MG: 100 TAB at 08:40

## 2021-04-05 RX ADMIN — DOCUSATE SODIUM 100 MG: 100 CAPSULE ORAL at 08:40

## 2021-04-05 RX ADMIN — DICLOFENAC SODIUM 2 G: 10 GEL TOPICAL at 11:08

## 2021-04-05 RX ADMIN — GABAPENTIN 600 MG: 300 CAPSULE ORAL at 08:39

## 2021-04-05 RX ADMIN — GABAPENTIN 600 MG: 300 CAPSULE ORAL at 14:43

## 2021-04-05 RX ADMIN — DOCUSATE SODIUM 100 MG: 100 CAPSULE ORAL at 17:53

## 2021-04-05 RX ADMIN — DICLOFENAC SODIUM 2 G: 10 GEL TOPICAL at 21:18

## 2021-04-05 RX ADMIN — DICLOFENAC SODIUM 2 G: 10 GEL TOPICAL at 08:41

## 2021-04-05 RX ADMIN — DIVALPROEX SODIUM 750 MG: 500 TABLET, EXTENDED RELEASE ORAL at 21:06

## 2021-04-05 RX ADMIN — POLYETHYLENE GLYCOL 3350 17 G: 17 POWDER, FOR SOLUTION ORAL at 18:58

## 2021-04-05 RX ADMIN — ACETAMINOPHEN 975 MG: 325 TABLET, FILM COATED ORAL at 19:02

## 2021-04-05 RX ADMIN — MELATONIN TAB 3 MG 6 MG: 3 TAB at 21:06

## 2021-04-05 RX ADMIN — NICOTINE 1 PATCH: 21 PATCH, EXTENDED RELEASE TRANSDERMAL at 08:38

## 2021-04-05 RX ADMIN — Medication 1 TABLET: at 08:41

## 2021-04-05 RX ADMIN — MIRTAZAPINE 15 MG: 15 TABLET, FILM COATED ORAL at 21:06

## 2021-04-05 RX ADMIN — PALIPERIDONE 6 MG: 3 TABLET, EXTENDED RELEASE ORAL at 08:39

## 2021-04-05 RX ADMIN — GABAPENTIN 600 MG: 300 CAPSULE ORAL at 21:06

## 2021-04-05 RX ADMIN — HALOPERIDOL 5 MG: 5 TABLET ORAL at 15:16

## 2021-04-05 RX ADMIN — FOLIC ACID 1 MG: 1 TABLET ORAL at 08:40

## 2021-04-05 NOTE — PROGRESS NOTES
04/05/21 1000 04/05/21 1131 04/05/21 1315   Activity/Group Checklist   Group Target [facility] Nursing Education Life Skills  (temptation bundling)   Attendance Did not attend Did not attend Attended   Attendance Duration (min)  --   --  31-45   Interactions  --   --  Interacted appropriately  (resisted participation at first, then engaged)   Affect/Mood  --   --  Appropriate   Goals Achieved  --   --  Discussed coping strategies; Able to reflect/comment on own behavior;Able to self-disclose; Able to listen to others; Able to engage in interactions      04/05/21 1415   Activity/Group Checklist   Group Other (Comment)  (leisure)   Attendance Did not attend   Attendance Duration (min)  --    Interactions  --    Affect/Mood  --    Goals Achieved  --    Pt attended 1/4 therapeutic groups with much encouragement  He initially declined to participate in activity, but with prompting and encouragement from peers, pt engaged in group activity  As group progressed, pt became more invested in group activity and offered insightful responses

## 2021-04-05 NOTE — CASE MANAGEMENT
CM sent message to Admissions Director, Ashley Rodriguez at [facility] to see if CM can fax him updated clinical information for pt for them to review as he requested to have pt have a few "better days" on the unit without altercations with staff and peers as well as no IM medications  Staff reported that Pt did fairly well over the weekend  CM requested that Ashley Rodriguez get back to CM to provide an update

## 2021-04-05 NOTE — PROGRESS NOTES
Pt remains mostly seclusive to his room, reports having a poor night sleep due to discomfort to right side of chest/abdomen  Pt states prn medications mildly effective however resting is more effective which is why pt reports he has been seclusive  Pt denies SI/HI, anxiety and depression  Denies AVH  Pt states being hopeful for discharge within the next few days and does feel ready for discharge once able to find a program he is accepted into

## 2021-04-05 NOTE — PROGRESS NOTES
Progress Note - Behavioral Health   Mahogany Padilla 31 y o  male MRN: [id number]  Unit/Bed#: [facility] 251-02 Encounter: [id number]    Assessment/Plan   Principal Problem:    Schizoaffective disorder, bipolar type ([facility] )  Active Problems:    Medical clearance for psychiatric admission    Right-sided chest wall pain    Tobacco abuse    Hallucinations    Emphysematous bleb of lung ([facility] [address] )    Constipation      Subjective: Patient was seen, chart reviewed and case discussed with team   Patient cooperative today during interview  States he feels better and is hopeful for upcoming discharge soon  He believes that he is ready for next level of care  Reports that his depression has decreased and is denying suicidal thoughts  States he feels less hopeless  Were reports over the weekend that he was more pleasant and did not require PRN IM medications for agitation  Was seclusive to his room did not attend groups  Does have fluctuating anxiety at times  States that his anxiety mostly is due to current life circumstances  Did not appear manic or agitated today  Denied all forms of hallucinations  Did not appear paranoid  Medication compliant  Appears to be tolerating medications well without serious side effects  Depakote level Wednesday      Psychiatric Review of Systems:  Behavior over the last 24 hours:  improved  Sleep: normal  Appetite: normal  Medication side effects: No  ROS: no complaints, all others negative    Current Medications:  Current Facility-Administered Medications   Medication Dose Route Frequency    acetaminophen (TYLENOL) tablet 325 mg  325 mg Oral Q6H PRN    acetaminophen (TYLENOL) tablet 975 mg  975 mg Oral Q6H PRN    aluminum-magnesium hydroxide-simethicone (MYLANTA) oral suspension 30 mL  30 mL Oral Q4H PRN    artificial tear (LUBRIFRESH P M ) ophthalmic ointment 1 application  1 application Both Eyes O8Z PRN    haloperidol lactate (HALDOL) injection 2 5 mg  2 5 mg Intramuscular Q6H PRN Max 4/day    And    benztropine (COGENTIN) injection 0 5 mg  0 5 mg Intramuscular Q6H PRN Max 4/day    haloperidol lactate (HALDOL) injection 5 mg  5 mg Intramuscular Q4H PRN Max 4/day    And    benztropine (COGENTIN) injection 1 mg  1 mg Intramuscular Q4H PRN Max 4/day    benztropine (COGENTIN) injection 1 mg  1 mg Intramuscular Q4H PRN Max 6/day    benztropine (COGENTIN) tablet 1 mg  1 mg Oral Q4H PRN Max 6/day    Diclofenac Sodium (VOLTAREN) 1 % topical gel 2 g  2 g Topical 4x Daily    diphenhydrAMINE (BENADRYL) tablet 25 mg  25 mg Oral Once PRN    divalproex sodium (DEPAKOTE ER) 24 hr tablet 750 mg  750 mg Oral HS    docusate sodium (COLACE) capsule 100 mg  100 mg Oral BID    folic acid (FOLVITE) tablet 1 mg  1 mg Oral Daily    gabapentin (NEURONTIN) capsule 600 mg  600 mg Oral TID    haloperidol (HALDOL) tablet 2 mg  2 mg Oral Q4H PRN Max 6/day    haloperidol (HALDOL) tablet 5 mg  5 mg Oral Q6H PRN Max 4/day    haloperidol (HALDOL) tablet 5 mg  5 mg Oral Q4H PRN Max 4/day    hydrOXYzine HCL (ATARAX) tablet 25 mg  25 mg Oral Q6H PRN Max 4/day    hydrOXYzine HCL (ATARAX) tablet 50 mg  50 mg Oral Q4H PRN Max 4/day    Or    LORazepam (ATIVAN) injection 1 mg  1 mg Intramuscular Q4H PRN    LORazepam (ATIVAN) tablet 1 mg  1 mg Oral Q4H PRN Max 6/day    Or    LORazepam (ATIVAN) injection 2 mg  2 mg Intramuscular Q6H PRN Max 3/day    melatonin tablet 6 mg  6 mg Oral HS    methocarbamol (ROBAXIN) tablet 500 mg  500 mg Oral Q6H PRN    mirtazapine (REMERON) tablet 15 mg  15 mg Oral HS    multivitamin-minerals (CENTRUM ADULTS) tablet 1 tablet  1 tablet Oral Daily    nicotine (NICODERM CQ) 21 mg/24 hr TD 24 hr patch 1 patch  1 patch Transdermal Daily    nicotine polacrilex (NICORETTE) gum 2 mg  2 mg Oral Q2H PRN    paliperidone (INVEGA) 24 hr tablet 6 mg  6 mg Oral Daily    polyethylene glycol (MIRALAX) packet 17 g  17 g Oral Daily PRN    senna-docusate sodium (SENOKOT S) 8 6-50 mg per tablet 1 tablet 1 tablet Oral Daily PRN    thiamine tablet 100 mg  100 mg Oral Daily    traZODone (DESYREL) tablet 50 mg  50 mg Oral HS PRN       Behavioral Health Medications: all current active meds have been reviewed and continue current psychiatric medications  Vitals:  Vitals:    04/05/21 1113   BP:    Pulse:    Resp:    Temp:    SpO2: 96%       Laboratory results:    I have personally reviewed all pertinent laboratory/tests results  Most Recent Labs:   Lab Results   Component Value Date    WBC 4 74 03/27/2021    RBC 4 68 03/27/2021    HGB 14 6 03/27/2021    HCT 45 2 03/27/2021     03/27/2021     03/27/2021    RDW 13 2 03/27/2021    NEUTROABS 2 16 03/27/2021    SODIUM 144 03/28/2021    K 4 1 03/28/2021     03/28/2021    CO2 28 03/28/2021    BUN 14 03/28/2021    CREATININE 1 16 03/28/2021    GLUC 112 03/28/2021    CALCIUM 8 8 03/28/2021    AST 28 03/27/2021    ALT 45 03/27/2021    ALKPHOS 72 03/27/2021    TP 6 5 03/27/2021    ALB 2 9 (L) 03/27/2021    TBILI 0 10 (L) 03/27/2021    VALPROICTOT 32 (L) 04/02/2021    MQC0CKUKKKAQ 1 106 03/27/2021    HGBA1C 5 3 03/27/2021     03/27/2021       Mental Status Evaluation:  Appearance:  casually dressed   Behavior:  More cooperative   Speech:  normal pitch and normal volume   Mood:  less anxious   Affect:  brighter   Language Appropriate   Thought Process:  goal directed and linear   Thought Content:  normal   Perceptual Disturbances: None   Risk Potential: Denied SI/HI    Potential for aggression: No   Sensorium:  person, place and time/date   Cognition:  recent and remote memory grossly intact   Consciousness:  alert and awake    Attention: attention span appeared shorter than expected for age   Insight:  partial   Judgment: improving   Gait/Station: normal gait/station and normal balance   Motor Activity: no abnormal movements     Progress Toward Goals: progressing    Recommended Treatment: Continue with pharmacotherapy, group therapy, milieu therapy and occupational therapy  1   Continue current medications  2  Depakote level Wednesday  3  Disposition planning with possible DC after level is taken    Risks, benefits and possible side effects of Medications:   Risks, benefits, and possible side effects of medications explained to patient and patient verbalizes understanding

## 2021-04-05 NOTE — PLAN OF CARE
Problem: Depression  Goal: Refrain from harming self  Description: Interventions:  - Monitor patient closely, per order   - Supervise medication ingestion, monitor effects and side effects   Outcome: Progressing  Goal: Refrain from isolation  Description: Interventions:  - Develop a trusting relationship   - Encourage socialization   Outcome: Progressing  Goal: Refrain from self-neglect  Outcome: Progressing  Goal: Attend and participate in unit activities, including therapeutic, recreational, and educational groups  Description: Interventions:  - Provide therapeutic and educational activities daily, encourage attendance and participation, and document same in the medical record   Outcome: Progressing  Goal: Complete daily ADLs, including personal hygiene independently, as able  Description: Interventions:  - Observe, teach, and assist patient with ADLS  -  Monitor and promote a balance of rest/activity, with adequate nutrition and elimination   Outcome: Progressing     Problem: Anxiety  Goal: Anxiety is at manageable level  Description: Interventions:  - Assess and monitor patient's anxiety level  - Monitor for signs and symptoms (heart palpitations, chest pain, shortness of breath, headaches, nausea, feeling jumpy, restlessness, irritable, apprehensive)  - Collaborate with interdisciplinary team and initiate plan and interventions as ordered    - [place] patient to unit/surroundings  - Explain treatment plan  - Encourage participation in care  - Encourage verbalization of concerns/fears  - Identify coping mechanisms  - Assist in developing anxiety-reducing skills  - Administer/offer alternative therapies  - Limit or eliminate stimulants  Outcome: Progressing     Problem: PAIN - ADULT  Goal: Verbalizes/displays adequate comfort level or baseline comfort level  Description: Interventions:  - Encourage patient to monitor pain and request assistance  - Assess pain using appropriate pain scale  - Administer analgesics based on type and severity of pain and evaluate response  - Implement non-pharmacological measures as appropriate and evaluate response  - Consider cultural and social influences on pain and pain management  - Notify physician/advanced practitioner if interventions unsuccessful or patient reports new pain  Outcome: Progressing     Problem: Risk for Violence/Aggression Toward Others  Goal: Treatment Goal: Refrain from acts of violence/aggression during length of stay, and demonstrate improved impulse control at the time of discharge  Outcome: Progressing  Goal: Refrain from harming others  Outcome: Progressing  Goal: Refrain from destructive acts on the environment or property  Outcome: Progressing  Goal: Control angry outbursts  Description: Interventions:  - Monitor patient closely, per order  - Ensure early verbal de-escalation  - Monitor prn medication needs  - Set reasonable/therapeutic limits, outline behavioral expectations, and consequences   - Provide a non-threatening milieu, utilizing the least restrictive interventions   Outcome: Progressing

## 2021-04-05 NOTE — CASE MANAGEMENT
CM met with pt to check in about dc planning  Pt continues to report that he would like to go for more mental health treatment at [facility]  CM informed him that due to pt medications being increased, he will need his Depakote level checked on Wednesday 4/7/2021 and that admissions director at [facility] requested that CM send updated clinical information to him on Wednesday  Pt was frustrated that he has to wait longer, but verbalized understanding of this  Pt spoke to CM about his current struggles with his ex and reported "my main issues are with her and if we are going to get back together or not " Pt reported that his ex is in support of him going for more treatment  Pt reported "I have been here so long and I don't know if I can stay much longer, I am also worried that I wait until Wednesday and then they deny me at [facility] anyway " CM encouraged pt to continue to attend groups, and do his part in his treatment and team will send his information to [facility] for them to determine if he is appropriate for their program "     Pt reported "I have been trying to attend groups and I am trying my best to not ask for Ativan and other PRNs "     CM will continue to check in and support pt until clinical information can be re-submitted to [facility]

## 2021-04-05 NOTE — PROGRESS NOTES
Patient received PRN medication At approximately 1515 for severe agitation  Upon reassessment patient presented with no agitation  Patient stated that medication was effective  Patient denies SI/HI/AVH at this time  At the beginning of this shift Patient was still desiring to speak with case management  Case management has been informed  Patient has no other questions or concerns at this time  Patient continues to show progress on unit and in groups  Will continue to monitor, educate, encourage and maintain patient safety

## 2021-04-05 NOTE — PROGRESS NOTES
Pt had a good day on Saturday  Pt did not receive any IM medication over the weekend  Pt interacted with select peers       DC: CM to resubmit clinical information to [facility] to see if pt it appropriate for their program       04/05/21 0158   Team Meeting   Meeting Type Daily Rounds   Team Members Present   Team Members Present Physician;Nurse;;Occupational Therapist   Physician Team Member Dr Monica Frances / Luciano Garcia / Luana Orozco Team Member [facility] Management Team Member Ricky Osler / Rupal Marshall / Keven Estrada   OT Team Member Harry Manrique / Issa Cash Student   Patient/Family Present   Patient Present No   Patient's Family Present No

## 2021-04-06 PROCEDURE — 99232 SBSQ HOSP IP/OBS MODERATE 35: CPT | Performed by: STUDENT IN AN ORGANIZED HEALTH CARE EDUCATION/TRAINING PROGRAM

## 2021-04-06 RX ADMIN — DOCUSATE SODIUM 100 MG: 100 CAPSULE ORAL at 08:41

## 2021-04-06 RX ADMIN — THIAMINE HCL TAB 100 MG 100 MG: 100 TAB at 08:42

## 2021-04-06 RX ADMIN — DOCUSATE SODIUM 100 MG: 100 CAPSULE ORAL at 17:27

## 2021-04-06 RX ADMIN — PALIPERIDONE 6 MG: 3 TABLET, EXTENDED RELEASE ORAL at 08:42

## 2021-04-06 RX ADMIN — DIVALPROEX SODIUM 750 MG: 500 TABLET, EXTENDED RELEASE ORAL at 21:05

## 2021-04-06 RX ADMIN — GABAPENTIN 600 MG: 300 CAPSULE ORAL at 21:05

## 2021-04-06 RX ADMIN — GABAPENTIN 600 MG: 300 CAPSULE ORAL at 08:42

## 2021-04-06 RX ADMIN — MELATONIN TAB 3 MG 6 MG: 3 TAB at 21:05

## 2021-04-06 RX ADMIN — FOLIC ACID 1 MG: 1 TABLET ORAL at 08:42

## 2021-04-06 RX ADMIN — Medication 1 TABLET: at 08:42

## 2021-04-06 RX ADMIN — GABAPENTIN 600 MG: 300 CAPSULE ORAL at 16:04

## 2021-04-06 RX ADMIN — MIRTAZAPINE 15 MG: 15 TABLET, FILM COATED ORAL at 21:05

## 2021-04-06 RX ADMIN — NICOTINE 1 PATCH: 21 PATCH, EXTENDED RELEASE TRANSDERMAL at 10:00

## 2021-04-06 NOTE — PLAN OF CARE
Problem: Depression  Goal: Refrain from harming self  Description: Interventions:  - Monitor patient closely, per order   - Supervise medication ingestion, monitor effects and side effects   Outcome: Progressing  Goal: Refrain from isolation  Description: Interventions:  - Develop a trusting relationship   - Encourage socialization   Outcome: Progressing  Goal: Refrain from self-neglect  Outcome: Progressing  Goal: Attend and participate in unit activities, including therapeutic, recreational, and educational groups  Description: Interventions:  - Provide therapeutic and educational activities daily, encourage attendance and participation, and document same in the medical record   Outcome: Progressing  Goal: Complete daily ADLs, including personal hygiene independently, as able  Description: Interventions:  - Observe, teach, and assist patient with ADLS  -  Monitor and promote a balance of rest/activity, with adequate nutrition and elimination   Outcome: Progressing     Problem: Ineffective Coping  Goal: Participates in unit activities  Description: Interventions:  - Provide therapeutic environment   - Provide required programming   - Redirect inappropriate behaviors   Outcome: Progressing

## 2021-04-06 NOTE — PROGRESS NOTES
Pt seclusive to room and napping in bed throughout the day  Irritable edge on interaction  Pt states "I'm on too many meds"  Reports feeling sedated  Pt wanting to rest in bed and agreeable to alert staff of any needs

## 2021-04-06 NOTE — PROGRESS NOTES
Pt continues to becomes irritable quickly when he needs are not met  Pt denies SI/HI and denies hallucinations       DC: Wednesday 4/7/2021 to [facility] (?) Maryam KANG will fax updated clinical for pt to determine if pt is appropriate for their program       04/06/21 9370   Team Meeting   Meeting Type Daily Rounds   Team Members Present   Team Members Present Physician;Nurse;;Occupational Therapist   Physician Team Member Dr Angeline Canales / Kya Kincaid / Ana Thorne Team Member Hector Aguilar / Octavio Estevez Management Team Member Henrry Mathur / Scott Tarango / Matt Goyal   OT Team Member Mane Barrientos / Harpal Camacho Student   Patient/Family Present   Patient Present No   Patient's Family Present No

## 2021-04-07 VITALS
OXYGEN SATURATION: 95 % | WEIGHT: 182.76 LBS | HEART RATE: 90 BPM | SYSTOLIC BLOOD PRESSURE: 112 MMHG | TEMPERATURE: 98.5 F | DIASTOLIC BLOOD PRESSURE: 55 MMHG | HEIGHT: 65 IN | RESPIRATION RATE: 16 BRPM | BODY MASS INDEX: 30.45 KG/M2

## 2021-04-07 LAB — VALPROATE SERPL-MCNC: 66 UG/ML (ref 50–100)

## 2021-04-07 PROCEDURE — 99239 HOSP IP/OBS DSCHRG MGMT >30: CPT | Performed by: STUDENT IN AN ORGANIZED HEALTH CARE EDUCATION/TRAINING PROGRAM

## 2021-04-07 PROCEDURE — 80164 ASSAY DIPROPYLACETIC ACD TOT: CPT | Performed by: PHYSICIAN ASSISTANT

## 2021-04-07 RX ADMIN — PALIPERIDONE 6 MG: 3 TABLET, EXTENDED RELEASE ORAL at 09:03

## 2021-04-07 RX ADMIN — FOLIC ACID 1 MG: 1 TABLET ORAL at 09:03

## 2021-04-07 RX ADMIN — DOCUSATE SODIUM 100 MG: 100 CAPSULE ORAL at 09:03

## 2021-04-07 RX ADMIN — THIAMINE HCL TAB 100 MG 100 MG: 100 TAB at 09:03

## 2021-04-07 RX ADMIN — GABAPENTIN 600 MG: 300 CAPSULE ORAL at 09:03

## 2021-04-07 RX ADMIN — GABAPENTIN 600 MG: 300 CAPSULE ORAL at 14:01

## 2021-04-07 RX ADMIN — Medication 1 TABLET: at 09:03

## 2021-04-07 RX ADMIN — NICOTINE 1 PATCH: 21 PATCH, EXTENDED RELEASE TRANSDERMAL at 09:02

## 2021-04-07 NOTE — CASE MANAGEMENT
CM received confirmation from [address] at [facility] that pt has been accepted to their program and can go today  He reported that he can arrange transportation for pt to be picked up at his grandmother's home in [place]  CM informed pt of acceptance to [facility]  He would still like to go to his grandmothers home at [address] [facility] to  his belongings and then CM spoke to him about [facility] sending transportation to [place] to pick him up  Pt is aware and verbalized understanding that they will bill him for this transportation service as they do not provide transportation and pt reported that he does not have anyone who can transport him  CM advised pt to call [address] when he arrives to [place] and can ask him questions or see what items he can pack  CM provided pt with [employer] cell phone  CM informed [address] [facility] of the plan, and that pt wishes to be picked up at 8pm so he has time to gather his belongings and get a shower   [address] confirmed that he will arrange transport to pick pt up at 8pm

## 2021-04-07 NOTE — BH TRANSITION RECORD
Contact Information: If you have any questions, concerns, pended studies, tests and/or procedures, or emergencies regarding your inpatient behavioral health visit  Please contact [place] behavioral health unit [phone] and ask to speak to a , nurse or physician  A contact is available 24 hours/ 7 days a week at this number  Summary of Procedures Performed During your Stay:  Below is a list of major procedures performed during your hospital stay and a summary of results:  - Cardiac Procedures/Studies: EKG  Pending Studies (From admission, onward)     Start     Ordered    04/07/21 0900  Valproic acid level, total  Morning draw      04/05/21 1257              If studies are pending at discharge, follow up with your PCP and/or referring provider

## 2021-04-07 NOTE — CASE MANAGEMENT
CM sent email to Yareli Eason at [facility] asking him to confirm that he received fax that JORGE LUIS sent this morning with updated clinical information for pt, and asked him to let CM know of their decision

## 2021-04-07 NOTE — CASE MANAGEMENT
CM faxed updated clinical information to [facility] @Fax#: [phone] for review   CM will speak to admissions director Lashaun Brand to discuss pt update and determine if pt is now appropriate for their program

## 2021-04-07 NOTE — PROGRESS NOTES
Pt is calm and cooperative, bright affect today  Attending morning group  Angie Lynch to learn if accepted at the [facility]

## 2021-04-07 NOTE — PROGRESS NOTES
Pt seclusive to his room, spent most of the day napping  Had irritable edge during the day  Pt was less irritable in the evening, slept overnight  No PRNs     DC: Today to the [facility] (?) - CM to fax updated clinical information to [facility] for review today        04/07/21 1778   Team Meeting   Meeting Type Daily Rounds   Team Members Present   Team Members Present Physician;Nurse;;Occupational Therapist   Physician Team Member Dr Culp / Leilani Garay / Tiffany Estrada Team Member [address] Management Team Member Yaya Burdick / Mary Baron   OT Team Member Eunice / Brando Dejesus Student   Patient/Family Present   Patient Present No   Patient's Family Present No

## 2021-04-07 NOTE — CASE MANAGEMENT
CM scheduled pt for DIAZ to be transported to his grandmothers ([place], [address] [facility]) home to gather his belongings and then [facility] will pick him up to go to their facility for further mental health treatment  Pt reported feeling anxious but ready to dc and go to the next level of treatment

## 2021-04-07 NOTE — CASE MANAGEMENT
JORGE LUIS faxed document to [address]  At [facility] as they requested for document showing that pt was hospitalized and that he is being transferred to [facility] for ongoing [address] [facility] faxed form showing that pt was discharged today 4/7/2021 and provided them with Phone and Fax number for [facility] for them to contact

## 2021-04-07 NOTE — DISCHARGE SUMMARY
Discharge Summary - [facility] 31 y o  male MRN: [id number]  Unit/Bed#: [facility] [place] 111-55 Encounter: [id number]     Admission Date:   Admission Orders (From admission, onward)     Ordered        03/29/21 1643  ED TO DIFFERENT CAMPUS IP [address] UNIT or INPATIENT MEDICAL UNIT to  Deana Pinon [facility] (using Discharge Readmit Navigator) - Admit Patient to [address] [place] [address] Unit  Once                         Discharge Date: 4/7/2021    Attending Psychiatrist: Yolanda Hung MD    Reason for Admission/HPI:   History of Present Illness    Patient is a 60-year-old male who presented to [place] ED due to psychiatric decline  In the ED patient stated he felt extremely fearful that people with red faces and horns were after him  Patient was evidently paranoid  He also stated he had auditory hallucinations of voices that tell him he is worthless and should jump off of a bridge  He did report at 1 point he was searching for a rope to hang himself  He did admit to abusing cocaine and K2  During crisis evaluation he stated that he was being followed and that people are trying to kill him  He was described to be a poor historian  He stated that he was at a bridge contemplating jumping off but did not jump because he knew that there were people are under the bridge waiting for him  He was recently admitted to [facility] SIFUENT[facility] but signed himself out early  In ED patient did require Haldol PRN for psychosis and agitation  On initial psychiatric evaluation patient reported not being compliant with psychiatric medications  He was noticed to have ongoing chest wall pain and did not want to speak much  He did require transfer to medical unit for evaluation  He was diagnosed with emphysematous bleb of lung and was explained to follow up outpatient after discharge  He did report mood related symptoms of low energy, anhedonia, hopelessness, and suicidal thoughts    He did report increased anxiety without panic attacks  He did not endorse criteria for elaina  He does have history of manic episodes  He did report having visual hallucinations but did not elaborate  He denied other forms of hallucinations and delusions    Patient has previous inpatient psychiatric hospitalizations, denied previous suicide attempts, and did not have current outpatient psychiatrist     Psychosocial Stressors:  Poor support system, drug abuse, medication noncompliance, chronic mental illness    Hospital Course:   Behavioral Health Medications:   current meds:   Current Facility-Administered Medications   Medication Dose Route Frequency    acetaminophen (TYLENOL) tablet 325 mg  325 mg Oral Q6H PRN    acetaminophen (TYLENOL) tablet 975 mg  975 mg Oral Q6H PRN    aluminum-magnesium hydroxide-simethicone (MYLANTA) oral suspension 30 mL  30 mL Oral Q4H PRN    artificial tear (LUBRIFRESH P M ) ophthalmic ointment 1 application  1 application Both Eyes H7I PRN    haloperidol lactate (HALDOL) injection 2 5 mg  2 5 mg Intramuscular Q6H PRN Max 4/day    And    benztropine (COGENTIN) injection 0 5 mg  0 5 mg Intramuscular Q6H PRN Max 4/day    haloperidol lactate (HALDOL) injection 5 mg  5 mg Intramuscular Q4H PRN Max 4/day    And    benztropine (COGENTIN) injection 1 mg  1 mg Intramuscular Q4H PRN Max 4/day    benztropine (COGENTIN) injection 1 mg  1 mg Intramuscular Q4H PRN Max 6/day    benztropine (COGENTIN) tablet 1 mg  1 mg Oral Q4H PRN Max 6/day    diphenhydrAMINE (BENADRYL) tablet 25 mg  25 mg Oral Once PRN    divalproex sodium (DEPAKOTE ER) 24 hr tablet 750 mg  750 mg Oral HS    docusate sodium (COLACE) capsule 100 mg  100 mg Oral BID    folic acid (FOLVITE) tablet 1 mg  1 mg Oral Daily    gabapentin (NEURONTIN) capsule 600 mg  600 mg Oral TID    haloperidol (HALDOL) tablet 2 mg  2 mg Oral Q4H PRN Max 6/day    haloperidol (HALDOL) tablet 5 mg  5 mg Oral Q6H PRN Max 4/day    haloperidol (HALDOL) tablet 5 mg 5 mg Oral Q4H PRN Max 4/day    hydrOXYzine HCL (ATARAX) tablet 25 mg  25 mg Oral Q6H PRN Max 4/day    hydrOXYzine HCL (ATARAX) tablet 50 mg  50 mg Oral Q4H PRN Max 4/day    Or    LORazepam (ATIVAN) injection 1 mg  1 mg Intramuscular Q4H PRN    LORazepam (ATIVAN) tablet 1 mg  1 mg Oral Q4H PRN Max 6/day    Or    LORazepam (ATIVAN) injection 2 mg  2 mg Intramuscular Q6H PRN Max 3/day    melatonin tablet 6 mg  6 mg Oral HS    methocarbamol (ROBAXIN) tablet 500 mg  500 mg Oral Q6H PRN    mirtazapine (REMERON) tablet 15 mg  15 mg Oral HS    multivitamin-minerals (CENTRUM ADULTS) tablet 1 tablet  1 tablet Oral Daily    nicotine (NICODERM CQ) 21 mg/24 hr TD 24 hr patch 1 patch  1 patch Transdermal Daily    nicotine polacrilex (NICORETTE) gum 2 mg  2 mg Oral Q2H PRN    paliperidone (INVEGA) 24 hr tablet 6 mg  6 mg Oral Daily    polyethylene glycol (MIRALAX) packet 17 g  17 g Oral Daily PRN    senna-docusate sodium (SENOKOT S) 8 6-50 mg per tablet 1 tablet  1 tablet Oral Daily PRN    thiamine tablet 100 mg  100 mg Oral Daily    traZODone (DESYREL) tablet 50 mg  50 mg Oral HS PRN       Patient was admitted to [address] [place] [address] inpatient psychiatric unit on voluntary 201 commitment for safety and stabilization  On admission patient was started on Invega 6mg QD for psychosis/mood stabilization, Depakote ER 500mg HS for mood stabilization, increased on Neurontin to 600mg TID, and continued on Remeron 15mg HS for insomnia/depression  Depakote ER was increased to 750mg HS  Melatonin 6mg HS was utilized for insomnia, which was effective  During the early portion of hospitalization patient was fairly irritable, demanding, and not invested in treatment  He was mostly seclusive to his room  He did require frequent PRN medications for agitation  After getting consistent medication patient did show improvement in his behavior  He became more cooperative and was no longer agitated or irritable    Psychotic symptoms also slowly decreased in intensity  He ultimately tolerated medications with no acute side effects  Depakote level on 4/7/2021 was 66 and deemed therapeutic  His mood brightened over the course of his treatment, and he was seen in [facility] interacting appropriately with peers  He did not demonstrate dangerous behavior to self or others during his inpatient stay  On day of discharge patient had reduced depression, controllable anxiety, denied psychosis, did not show signs of elaina, and denied suicidal/homicidal ideations  Mental Status at time of Discharge:     Appearance:  casually dressed   Behavior:  Cooperative   Speech:  normal pitch and normal volume   Mood:  Less anxious   Affect:  mood-congruent   Thought Process:  goal directed and Linear   Thought Content:  normal   Perceptual Disturbances: Diminished chronic auditory hallucinations   Risk Potential: Denied SI/HI  Potential for aggression no   Sensorium:  person, place and time/date   Cognition:  recent and remote memory grossly intact   Consciousness:  alert and awake    Attention: attention span appeared expected for age   Insight:  fair   Judgment: fair   Gait/Station: normal gait/station and normal balance   Motor Activity: no abnormal movements       Discharge Diagnosis:   Schizoaffective disorder, bipolar type  Anxiety  Alcohol abuse  Cocaine abuse      Discharge Medications:  See after visit summary for reconciled discharge medications provided to patient and family  Discharge instructions/Information to patient and family:   See after visit summary for information provided to patient and family  Provisions for Follow-Up Care:  See after visit summary for information related to follow-up care and any pertinent home health orders  Discharge Statement   I spent 34 minutes discharging the patient  This time was spent on the day of discharge  I had direct contact with the patient on the day of discharge    On day of discharge patient had mental status exam performed, discharge instructions/medications reviewed, and outpatient planning discussed  He was given 1 month plus 1 refill of scripts  He denied tobacco cessation therapy after discharge  He was admitted to a rehab facility after discharge      Casey Lagunas PA-C

## 2021-04-07 NOTE — CASE MANAGEMENT
CM called [facility] ([phone]) to confirm that pt can call on his own to schedule a follow up appointment after he is discharged from rehab program at [facility]  JORGE LUIS spoke to Aleksander Chopra and she confirmed that pt can call on his own when he is getting closer to dc from rehab  CM added Pulmonary phone number and address to pt AVS for him to follow up

## 2023-08-28 NOTE — ED NOTES
Cw spoke with patient and informed him of his acceptance at [facility]  Informed him that he would be transported by car or van through [facility]  Cw explained what CTS was, and patient still appeared confused  Cw ensured him that this is normal hospital procedure and he will be fine  Pt asked for the nurse about pain he was having  Post-Care Instructions: Absorbable wound care. No tape